# Patient Record
Sex: MALE | Race: OTHER | HISPANIC OR LATINO
[De-identification: names, ages, dates, MRNs, and addresses within clinical notes are randomized per-mention and may not be internally consistent; named-entity substitution may affect disease eponyms.]

---

## 2020-01-10 ENCOUNTER — APPOINTMENT (OUTPATIENT)
Dept: INTERNAL MEDICINE | Facility: CLINIC | Age: 60
End: 2020-01-10

## 2020-04-06 ENCOUNTER — INPATIENT (INPATIENT)
Facility: HOSPITAL | Age: 60
LOS: 10 days | Discharge: HOME | DRG: 177 | End: 2020-04-17
Attending: HOSPITALIST | Admitting: HOSPITALIST
Payer: COMMERCIAL

## 2020-04-06 ENCOUNTER — TRANSCRIPTION ENCOUNTER (OUTPATIENT)
Age: 60
End: 2020-04-06

## 2020-04-06 VITALS
OXYGEN SATURATION: 95 % | RESPIRATION RATE: 20 BRPM | HEIGHT: 65 IN | SYSTOLIC BLOOD PRESSURE: 143 MMHG | HEART RATE: 86 BPM | TEMPERATURE: 99 F | WEIGHT: 210.1 LBS | DIASTOLIC BLOOD PRESSURE: 77 MMHG

## 2020-04-06 DIAGNOSIS — J18.9 PNEUMONIA, UNSPECIFIED ORGANISM: ICD-10-CM

## 2020-04-06 LAB
ALBUMIN SERPL ELPH-MCNC: 3.5 G/DL — SIGNIFICANT CHANGE UP (ref 3.3–5.2)
ALP SERPL-CCNC: 84 U/L — SIGNIFICANT CHANGE UP (ref 40–120)
ALT FLD-CCNC: 85 U/L — HIGH
ANION GAP SERPL CALC-SCNC: 17 MMOL/L — SIGNIFICANT CHANGE UP (ref 5–17)
AST SERPL-CCNC: 98 U/L — HIGH
BASOPHILS # BLD AUTO: 0.01 K/UL — SIGNIFICANT CHANGE UP (ref 0–0.2)
BASOPHILS NFR BLD AUTO: 0.1 % — SIGNIFICANT CHANGE UP (ref 0–2)
BILIRUB SERPL-MCNC: 0.6 MG/DL — SIGNIFICANT CHANGE UP (ref 0.4–2)
BUN SERPL-MCNC: 11 MG/DL — SIGNIFICANT CHANGE UP (ref 8–20)
CALCIUM SERPL-MCNC: 9.3 MG/DL — SIGNIFICANT CHANGE UP (ref 8.6–10.2)
CHLORIDE SERPL-SCNC: 91 MMOL/L — LOW (ref 98–107)
CO2 SERPL-SCNC: 23 MMOL/L — SIGNIFICANT CHANGE UP (ref 22–29)
CREAT SERPL-MCNC: 0.77 MG/DL — SIGNIFICANT CHANGE UP (ref 0.5–1.3)
CRP SERPL-MCNC: 22.98 MG/DL — HIGH (ref 0–0.4)
EOSINOPHIL # BLD AUTO: 0 K/UL — SIGNIFICANT CHANGE UP (ref 0–0.5)
EOSINOPHIL NFR BLD AUTO: 0 % — SIGNIFICANT CHANGE UP (ref 0–6)
ERYTHROCYTE [SEDIMENTATION RATE] IN BLOOD: 15 MM/HR — SIGNIFICANT CHANGE UP (ref 0–20)
FERRITIN SERPL-MCNC: 3420 NG/ML — HIGH (ref 30–400)
GLUCOSE SERPL-MCNC: 115 MG/DL — HIGH (ref 70–99)
HCT VFR BLD CALC: 45.1 % — SIGNIFICANT CHANGE UP (ref 39–50)
HGB BLD-MCNC: 14.7 G/DL — SIGNIFICANT CHANGE UP (ref 13–17)
IMM GRANULOCYTES NFR BLD AUTO: 0.8 % — SIGNIFICANT CHANGE UP (ref 0–1.5)
LDH SERPL L TO P-CCNC: 728 U/L — HIGH (ref 98–192)
LYMPHOCYTES # BLD AUTO: 1.37 K/UL — SIGNIFICANT CHANGE UP (ref 1–3.3)
LYMPHOCYTES # BLD AUTO: 9.6 % — LOW (ref 13–44)
MCHC RBC-ENTMCNC: 29.6 PG — SIGNIFICANT CHANGE UP (ref 27–34)
MCHC RBC-ENTMCNC: 32.6 GM/DL — SIGNIFICANT CHANGE UP (ref 32–36)
MCV RBC AUTO: 90.9 FL — SIGNIFICANT CHANGE UP (ref 80–100)
MONOCYTES # BLD AUTO: 0.6 K/UL — SIGNIFICANT CHANGE UP (ref 0–0.9)
MONOCYTES NFR BLD AUTO: 4.2 % — SIGNIFICANT CHANGE UP (ref 2–14)
NEUTROPHILS # BLD AUTO: 12.17 K/UL — HIGH (ref 1.8–7.4)
NEUTROPHILS NFR BLD AUTO: 85.3 % — HIGH (ref 43–77)
PLATELET # BLD AUTO: 313 K/UL — SIGNIFICANT CHANGE UP (ref 150–400)
POTASSIUM SERPL-MCNC: 5.1 MMOL/L — SIGNIFICANT CHANGE UP (ref 3.5–5.3)
POTASSIUM SERPL-SCNC: 5.1 MMOL/L — SIGNIFICANT CHANGE UP (ref 3.5–5.3)
PROCALCITONIN SERPL-MCNC: 0.19 NG/ML — HIGH (ref 0.02–0.1)
PROT SERPL-MCNC: 7.8 G/DL — SIGNIFICANT CHANGE UP (ref 6.6–8.7)
RBC # BLD: 4.96 M/UL — SIGNIFICANT CHANGE UP (ref 4.2–5.8)
RBC # FLD: 12.7 % — SIGNIFICANT CHANGE UP (ref 10.3–14.5)
SODIUM SERPL-SCNC: 131 MMOL/L — LOW (ref 135–145)
TROPONIN T SERPL-MCNC: <0.01 NG/ML — SIGNIFICANT CHANGE UP (ref 0–0.06)
WBC # BLD: 14.26 K/UL — HIGH (ref 3.8–10.5)
WBC # FLD AUTO: 14.26 K/UL — HIGH (ref 3.8–10.5)

## 2020-04-06 PROCEDURE — 93010 ELECTROCARDIOGRAM REPORT: CPT

## 2020-04-06 PROCEDURE — 99285 EMERGENCY DEPT VISIT HI MDM: CPT

## 2020-04-06 PROCEDURE — 71045 X-RAY EXAM CHEST 1 VIEW: CPT | Mod: 26

## 2020-04-06 PROCEDURE — 99223 1ST HOSP IP/OBS HIGH 75: CPT

## 2020-04-06 RX ORDER — LOSARTAN POTASSIUM 100 MG/1
50 TABLET, FILM COATED ORAL DAILY
Refills: 0 | Status: DISCONTINUED | OUTPATIENT
Start: 2020-04-06 | End: 2020-04-17

## 2020-04-06 RX ORDER — HYDROXYCHLOROQUINE SULFATE 200 MG
TABLET ORAL
Refills: 0 | Status: COMPLETED | OUTPATIENT
Start: 2020-04-06 | End: 2020-04-11

## 2020-04-06 RX ORDER — HYDROXYCHLOROQUINE SULFATE 200 MG
200 TABLET ORAL EVERY 12 HOURS
Refills: 0 | Status: COMPLETED | OUTPATIENT
Start: 2020-04-07 | End: 2020-04-11

## 2020-04-06 RX ORDER — ACETAMINOPHEN 500 MG
650 TABLET ORAL EVERY 4 HOURS
Refills: 0 | Status: DISCONTINUED | OUTPATIENT
Start: 2020-04-06 | End: 2020-04-17

## 2020-04-06 RX ORDER — ALBUTEROL 90 UG/1
2 AEROSOL, METERED ORAL EVERY 4 HOURS
Refills: 0 | Status: DISCONTINUED | OUTPATIENT
Start: 2020-04-06 | End: 2020-04-17

## 2020-04-06 RX ORDER — IRBESARTAN 75 MG/1
1 TABLET ORAL
Qty: 0 | Refills: 0 | DISCHARGE

## 2020-04-06 RX ORDER — THIAMINE MONONITRATE (VIT B1) 100 MG
200 TABLET ORAL DAILY
Refills: 0 | Status: DISCONTINUED | OUTPATIENT
Start: 2020-04-06 | End: 2020-04-17

## 2020-04-06 RX ORDER — ACETAMINOPHEN 500 MG
975 TABLET ORAL ONCE
Refills: 0 | Status: COMPLETED | OUTPATIENT
Start: 2020-04-06 | End: 2020-04-06

## 2020-04-06 RX ORDER — HYDROXYCHLOROQUINE SULFATE 200 MG
400 TABLET ORAL EVERY 12 HOURS
Refills: 0 | Status: COMPLETED | OUTPATIENT
Start: 2020-04-06 | End: 2020-04-07

## 2020-04-06 RX ORDER — ASCORBIC ACID 60 MG
1000 TABLET,CHEWABLE ORAL DAILY
Refills: 0 | Status: DISCONTINUED | OUTPATIENT
Start: 2020-04-06 | End: 2020-04-17

## 2020-04-06 RX ORDER — ENOXAPARIN SODIUM 100 MG/ML
40 INJECTION SUBCUTANEOUS DAILY
Refills: 0 | Status: DISCONTINUED | OUTPATIENT
Start: 2020-04-06 | End: 2020-04-07

## 2020-04-06 RX ADMIN — Medication 975 MILLIGRAM(S): at 19:13

## 2020-04-06 NOTE — ED PROVIDER NOTE - PROGRESS NOTE DETAILS
Bk Hoffman, Resident: Pt noted to be comfortable on 6L RA, 96% O2 saturation, RR 20. Bk Hoffman, Resident: Pt noted to be comfortable on 6L RA, 95% O2 sat. Spoke with hospitalist Dr. Melendez who agrees to admission.

## 2020-04-06 NOTE — ED PROVIDER NOTE - OBJECTIVE STATEMENT
Pt is a 60 y.o. M hx HTN presenting with shortness of breath for two days. The pt has had intermittent fevers for the last weeks for which he has been taking alcohol. He states he has had a nonproductive cough, lower back pain, and shortness of breath for the last two days. Denies any sick contacts, recent history of travel. Denies chest pain, nausea, vomiting. Has had decreased appetite.

## 2020-04-06 NOTE — H&P ADULT - NSHPPHYSICALEXAM_GEN_ALL_CORE
Vital Signs Last 24 Hrs  T(C): 37.4 (06 Apr 2020 19:52), Max: 38 (06 Apr 2020 18:55)  T(F): 99.3 (06 Apr 2020 19:52), Max: 100.4 (06 Apr 2020 18:55)  HR: 84 (06 Apr 2020 18:55) (84 - 86)  BP: 167/83 (06 Apr 2020 18:55) (143/77 - 167/83)  RR: 20 (06 Apr 2020 18:55) (20 - 20)  SpO2: 100% (06 Apr 2020 18:55) (88% - 100%)    General: Well nourished/Well developed, NAD  Head:  Normocephalic, atraumatic  ENT:  Mucosa moist, no ulcerations (ED provider exam)  Neck:  Supple, no sinuses or palpable masses  Respiratory: CTA B/L  CV: RRR, S1S2, no murmur  GI: Soft, NT, ND no palpable mass  Extremities: No edema, + peripheral pulses, FROM all 4 extremity  Neurology: A&Ox3, no focalization signs

## 2020-04-06 NOTE — H&P ADULT - HISTORY OF PRESENT ILLNESS
A 60 y.o. Male with PMHx significant for HTN, HDL presents c/o shortness of breath for three days. Patient reports his symptoms started 5-6 days ago, and initially presented with back pain/muscle aches, headaches, dry cough and subjective fever, poor apatite. Patient reports, he called his PMD who advised him to take Tylenol for his symptoms and self quarantine. Today SOB worsened, noted also chest tightness worsened by ambulation  Denies any sick contacts, recent history of travel. Denies chest pain, nausea, vomiting.   At the ED noted to be hypoxic sating 88% on RA, was placed on 5 Lts of O2 via NC, mild fever 100.4, CXR: bilateral interstitial lung opacities

## 2020-04-06 NOTE — ED PROVIDER NOTE - NS ED ROS FT
Constitutional: +fever, no sweats, and no chills.  Eyes: no pain, no redness, and no visual changes.  ENMT: no ear pain and no hearing problems, no nasal congestion/drainage, no dysphagia, and no throat pain, no neck pain, no stiffness  CV: no chest pain, no edema.  Resp: +cough/dyspnea  GI: no abdominal pain, no bloating, no constipation, no diarrhea, no nausea and no vomiting.  : no dysuria, no hematuria  MSK: +lower back pain, no weakness  Skin: no jaundice, no lesions, and no rashes.  Neuro: no LOC, no headache, no sensory deficits, and no weakness.

## 2020-04-06 NOTE — ED ADULT NURSE REASSESSMENT NOTE - NS ED NURSE REASSESS COMMENT FT1
Patient resting comfortably in stretcher, awake alert, and oriented times 3, breathing unlabored.  Patient has no complaints at this time.

## 2020-04-06 NOTE — H&P ADULT - NSHPLABSRESULTS_GEN_ALL_CORE
14.7   14.26 )-----------( 313      ( 06 Apr 2020 15:57 )             45.1     04-06    131<L>  |  91<L>  |  11.0  ----------------------------<  115<H>  5.1   |  23.0  |  0.77    Ca    9.3      06 Apr 2020 15:57    TPro  7.8  /  Alb  3.5  /  TBili  0.6  /  DBili  x   /  AST  98<H>  /  ALT  85<H>  /  AlkPhos  84  04-06      < from: Xray Chest 1 View-PORTABLE IMMEDIATE (04.06.20 @ 14:28) >     EXAM:  XR CHEST PORTABLE IMMED 1V                          PROCEDURE DATE:  04/06/2020          INTERPRETATION:  Clinical information: Shortness of breath.    TECHNIQUE: Frontal view of the chest.    COMPARISON: Prior chest x-ray examination dated4/6/2028 at 11:55 AM.    FINDINGS: Vague patchy bilateral interstitial lung opacities appear grossly similar. The heart size is at the upper limits of normal. The visualized osseous structures are unremarkable.    IMPRESSION: Unchanged vague patchy bilateral interstitial lung opacities. Developing multifocal pneumonia is most likely. COVID-19 pneumonia cannot be excluded. Clinical correlation is required.    < end of copied text >

## 2020-04-06 NOTE — ED PROVIDER NOTE - ATTENDING CONTRIBUTION TO CARE
60yoM; with pmh signif for HTN; now p/w sobx2 days.  patient reports intermittent fevers and cough for 2 weeks. cough--nonproductive, progressively worsening, with sob and chest pain these past 2 days.  denies sick contacts. denies travel. denies n/v/d. denies chestp ain. is reporting some lower back pain.   EXAM:  General:  mild resp distress  Head:     NC/AT, EOMI, oral mucosa moist  Neck:     trachea midline  Lungs:     CTA b/l, trace exp wheeze  CVS:     S1S2, RRR, no m/g/r  Abd:     +BS, s/nt/nd, no organomegaly  Ext:    2+ radial and pedal pulses, no c/c/e  Neuro: AAOx3, no sensory/motor deficits  A/P:  60yoM p/w sob with hypoxia, likely covid pna  -xray, labs, o2 supplementation, and likely admit

## 2020-04-06 NOTE — H&P ADULT - ASSESSMENT
A 59 yo Male with PMH significant for HTN, HLD presents today c/o cough, subjective fever, worsening SOB for the 3 days, being admitted for Acute Respiratory Failure with Hypoxia likely secondary to viral Pneumonia, COVID-19 result pending.  In the ED, saturation on RA 88%; placed on O2 via NC 5-6; inflammatory makers elevated. CXR: multifocal pneumonia    > Admit to medicine service  > Bed: Monitor and    > Diet: Dash/tlc  > Nursing: vitals per routine  > IV fluids: none  > Oxygen: O2 via NC; keep O2 saturation > 92%    Acute respiratory failure with hypoxia due to viral pneumonia, pending CoVID-19 result    Admit to medical floor with    CoVID-19 pending   Inflammatory makers elevated (CRP, LDH, Ferritin)   Oxygen therapy   Isolation protocol   Tylenol PRN   Prone positioning every hour for 15minutes or more as tolerated   F/u am labs    Leukocytosis likely due viral illness   WBC: 14k  F/u CBC am    Transaminitis   AST and ALT elevated   Likely due to viral illness    Trend LFT     Hyponatremia   Na: 131   Likely due to dehydration 2/2 poor PO intake   Will avoid IVF, has been shown that COVID patients tend to fluid over load easily  Monitor BMP     HTN  Add Losartan 50 mg QD (at home on Irbersartan 150mg)  Dash/TLC diet    HLD  Hold home regimen due to elevated LFT'; Atorvastatin 10mg QD     Supportive   DVT prophylaxis: Lovenox     Code status: Full code    Dispo: Length of stay likely 2-3 days, pending improvement on respiratory status A 61 yo Male with PMH significant for HTN, HLD presents today c/o cough, subjective fever, worsening SOB for the 3 days, being admitted for Acute Respiratory Failure with Hypoxia likely secondary to viral Pneumonia, COVID-19 result pending.  In the ED, saturation on RA 88%; placed on O2 via NC 5-6; inflammatory makers elevated. CXR: multifocal pneumonia    > Admit to medicine service  > Bed: Monitor and    > Diet: Dash/tlc  > Nursing: vitals per routine  > IV fluids: none  > Oxygen: O2 via NC; keep O2 saturation > 92%    Acute respiratory failure with hypoxia due to viral pneumonia, pending CoVID-19 result    Admit to medical floor with    CoVID-19 pending   Inflammatory makers elevated (CRP, LDH, Ferritin)   Plaquenil started as per protocol; Please consult ID in AM to continue the med.   Oxygen therapy   Isolation protocol   Tylenol PRN   Prone positioning every hour for 15minutes or more as tolerated   F/u am labs    Leukocytosis likely due viral illness   WBC: 14k  F/u CBC am    Transaminitis   AST and ALT elevated   Likely due to viral illness    Trend LFT     Hyponatremia   Na: 131   Likely due to dehydration 2/2 poor PO intake   Will avoid IVF, has been shown that COVID patients tend to fluid over load easily  Monitor BMP     HTN  Add Losartan 50 mg QD (at home on Irbersartan 150mg)  Dash/TLC diet    HLD  Hold home regimen due to elevated LFT'; Atorvastatin 10mg QD     Supportive   DVT prophylaxis: Lovenox     Code status: Full code    Dispo: Length of stay likely 2-3 days, pending improvement on respiratory status

## 2020-04-06 NOTE — H&P ADULT - ATTENDING COMMENTS
59 yo Male with PMH significant for HTN, HLD presents today c/o cough, subjective fever, worsening SOB for the 3 days, being admitted for Acute Respiratory Failure with Hypoxia due to suspected COVID-19 infection.   -cont with supplemental oxygen as needed to keep Sao2>94  -Currently patient tolerating 5L NC  -admit to monitor bed with   -EKG reviewed , started on Plaquenil   -Agree with rest of plan as above

## 2020-04-06 NOTE — ED PROVIDER NOTE - PHYSICAL EXAMINATION
General: well appearing, NAD  Head:  NC, AT  Eyes: EOMI, PERRLA, no scleral icterus  Ears: no erythema/drainage  Nose: midline, no bleeding/drainage  Throat: MMM  Cardiac: RRR, no m/r/g, no lower extremity edema  Respiratory: CTABL, no wheezes/rales/rhonchi, equal chest wall expansions  Abdomen: soft, ND, NT, no rebound tenderness, no guarding, nonperitonitic  MSK/Vascular: full ROM, distal pulses intact, soft compartments, warm extremities  Neuro: AAOx3, negative pronator drift, strength 5/5, sensation to light touch intact, finger to nose coordination intact, cranial nerves 2-12 intact  Psych: calm, cooperative, normal affect

## 2020-04-06 NOTE — ED PROVIDER NOTE - CLINICAL SUMMARY MEDICAL DECISION MAKING FREE TEXT BOX
Pt is a 60 y.o. M presenting with fever, cough, and hypoxia requiring O2 supplementation. Labs, imaging, EKG. Will provide medication prn and reassess.

## 2020-04-06 NOTE — ED ADULT TRIAGE NOTE - CHIEF COMPLAINT QUOTE
"I went to urgent care for cough and shortness of breath" "I am out of breath when I walk" c/o being sent from urgent care for subjective fevers sough SOB and fatigue. received CXR and swab prior to arrival -per EMS. SAO2 93% on NRB. skin warm and dry

## 2020-04-07 LAB
ALBUMIN SERPL ELPH-MCNC: 3.4 G/DL — SIGNIFICANT CHANGE UP (ref 3.3–5.2)
ALP SERPL-CCNC: 82 U/L — SIGNIFICANT CHANGE UP (ref 40–120)
ALT FLD-CCNC: 79 U/L — HIGH
ANION GAP SERPL CALC-SCNC: 14 MMOL/L — SIGNIFICANT CHANGE UP (ref 5–17)
AST SERPL-CCNC: 78 U/L — HIGH
BASOPHILS # BLD AUTO: 0 K/UL — SIGNIFICANT CHANGE UP (ref 0–0.2)
BASOPHILS NFR BLD AUTO: 0 % — SIGNIFICANT CHANGE UP (ref 0–2)
BILIRUB SERPL-MCNC: 0.7 MG/DL — SIGNIFICANT CHANGE UP (ref 0.4–2)
BUN SERPL-MCNC: 18 MG/DL — SIGNIFICANT CHANGE UP (ref 8–20)
CALCIUM SERPL-MCNC: 9 MG/DL — SIGNIFICANT CHANGE UP (ref 8.6–10.2)
CHLORIDE SERPL-SCNC: 94 MMOL/L — LOW (ref 98–107)
CO2 SERPL-SCNC: 24 MMOL/L — SIGNIFICANT CHANGE UP (ref 22–29)
CREAT SERPL-MCNC: 0.71 MG/DL — SIGNIFICANT CHANGE UP (ref 0.5–1.3)
EOSINOPHIL # BLD AUTO: 0 K/UL — SIGNIFICANT CHANGE UP (ref 0–0.5)
EOSINOPHIL NFR BLD AUTO: 0 % — SIGNIFICANT CHANGE UP (ref 0–6)
GIANT PLATELETS BLD QL SMEAR: PRESENT — SIGNIFICANT CHANGE UP
GLUCOSE SERPL-MCNC: 165 MG/DL — HIGH (ref 70–99)
HCT VFR BLD CALC: 46.1 % — SIGNIFICANT CHANGE UP (ref 39–50)
HCV AB S/CO SERPL IA: 0.15 S/CO — SIGNIFICANT CHANGE UP (ref 0–0.99)
HCV AB SERPL-IMP: SIGNIFICANT CHANGE UP
HGB BLD-MCNC: 15.2 G/DL — SIGNIFICANT CHANGE UP (ref 13–17)
LYMPHOCYTES # BLD AUTO: 0.27 K/UL — LOW (ref 1–3.3)
LYMPHOCYTES # BLD AUTO: 3.7 % — LOW (ref 13–44)
MAGNESIUM SERPL-MCNC: 2.5 MG/DL — SIGNIFICANT CHANGE UP (ref 1.6–2.6)
MANUAL SMEAR VERIFICATION: SIGNIFICANT CHANGE UP
MCHC RBC-ENTMCNC: 30.2 PG — SIGNIFICANT CHANGE UP (ref 27–34)
MCHC RBC-ENTMCNC: 33 GM/DL — SIGNIFICANT CHANGE UP (ref 32–36)
MCV RBC AUTO: 91.5 FL — SIGNIFICANT CHANGE UP (ref 80–100)
MONOCYTES # BLD AUTO: 0.07 K/UL — SIGNIFICANT CHANGE UP (ref 0–0.9)
MONOCYTES NFR BLD AUTO: 1 % — LOW (ref 2–14)
NEUTROPHILS # BLD AUTO: 6.91 K/UL — SIGNIFICANT CHANGE UP (ref 1.8–7.4)
NEUTROPHILS NFR BLD AUTO: 93.6 % — HIGH (ref 43–77)
NEUTS BAND # BLD: 1.7 % — SIGNIFICANT CHANGE UP (ref 0–8)
PHOSPHATE SERPL-MCNC: 3.3 MG/DL — SIGNIFICANT CHANGE UP (ref 2.4–4.7)
PLAT MORPH BLD: NORMAL — SIGNIFICANT CHANGE UP
PLATELET # BLD AUTO: 342 K/UL — SIGNIFICANT CHANGE UP (ref 150–400)
POIKILOCYTOSIS BLD QL AUTO: SIGNIFICANT CHANGE UP
POTASSIUM SERPL-MCNC: 4.9 MMOL/L — SIGNIFICANT CHANGE UP (ref 3.5–5.3)
POTASSIUM SERPL-SCNC: 4.9 MMOL/L — SIGNIFICANT CHANGE UP (ref 3.5–5.3)
PROT SERPL-MCNC: 7.5 G/DL — SIGNIFICANT CHANGE UP (ref 6.6–8.7)
RBC # BLD: 5.04 M/UL — SIGNIFICANT CHANGE UP (ref 4.2–5.8)
RBC # FLD: 13 % — SIGNIFICANT CHANGE UP (ref 10.3–14.5)
RBC BLD AUTO: ABNORMAL
SARS-COV-2 RNA SPEC QL NAA+PROBE: SIGNIFICANT CHANGE UP
SODIUM SERPL-SCNC: 132 MMOL/L — LOW (ref 135–145)
WBC # BLD: 7.25 K/UL — SIGNIFICANT CHANGE UP (ref 3.8–10.5)
WBC # FLD AUTO: 7.25 K/UL — SIGNIFICANT CHANGE UP (ref 3.8–10.5)

## 2020-04-07 PROCEDURE — 99232 SBSQ HOSP IP/OBS MODERATE 35: CPT

## 2020-04-07 RX ORDER — ENOXAPARIN SODIUM 100 MG/ML
40 INJECTION SUBCUTANEOUS
Refills: 0 | Status: DISCONTINUED | OUTPATIENT
Start: 2020-04-07 | End: 2020-04-17

## 2020-04-07 RX ADMIN — ALBUTEROL 2 PUFF(S): 90 AEROSOL, METERED ORAL at 19:02

## 2020-04-07 RX ADMIN — ALBUTEROL 2 PUFF(S): 90 AEROSOL, METERED ORAL at 13:10

## 2020-04-07 RX ADMIN — Medication 400 MILLIGRAM(S): at 18:59

## 2020-04-07 RX ADMIN — Medication 200 MILLIGRAM(S): at 13:08

## 2020-04-07 RX ADMIN — Medication 1000 MILLIGRAM(S): at 13:08

## 2020-04-07 RX ADMIN — LOSARTAN POTASSIUM 50 MILLIGRAM(S): 100 TABLET, FILM COATED ORAL at 06:06

## 2020-04-07 RX ADMIN — Medication 400 MILLIGRAM(S): at 05:59

## 2020-04-07 RX ADMIN — ENOXAPARIN SODIUM 40 MILLIGRAM(S): 100 INJECTION SUBCUTANEOUS at 12:30

## 2020-04-07 RX ADMIN — Medication 200 MILLIGRAM(S): at 22:48

## 2020-04-07 NOTE — PROGRESS NOTE ADULT - SUBJECTIVE AND OBJECTIVE BOX
seen for SOB, r/o covid      still w/ sob cough  ros otherwise negative     MEDICATIONS  (STANDING):  ascorbic acid 1000 milliGRAM(s) Oral daily  enoxaparin Injectable 40 milliGRAM(s) SubCutaneous two times a day  hydroxychloroquine   Oral   hydroxychloroquine 400 milliGRAM(s) Oral every 12 hours  hydroxychloroquine 200 milliGRAM(s) Oral every 12 hours  losartan 50 milliGRAM(s) Oral daily  thiamine 200 milliGRAM(s) Oral daily    MEDICATIONS  (PRN):  acetaminophen   Tablet .. 650 milliGRAM(s) Oral every 4 hours PRN Temp greater or equal to 38.5C (101.3F)  acetaminophen  Suppository .. 650 milliGRAM(s) Rectal every 4 hours PRN Temp greater or equal to 38.5C (101.3F)  ALBUTerol    90 MICROgram(s) HFA Inhaler 2 Puff(s) Inhalation every 4 hours PRN Shortness of Breath and/or Wheezing  benzonatate 100 milliGRAM(s) Oral three times a day PRN Cough      Allergies    No Known Allergies      Vital Signs Last 24 Hrs  T(C): 36.8 (07 Apr 2020 09:15), Max: 38 (06 Apr 2020 18:55)  T(F): 98.2 (07 Apr 2020 09:15), Max: 100.4 (06 Apr 2020 18:55)  HR: 73 (07 Apr 2020 09:15) (73 - 85)  BP: 116/76 (07 Apr 2020 09:15) (116/76 - 167/83)  BP(mean): --  RR: 18 (07 Apr 2020 09:15) (18 - 20)  SpO2: 96% (07 Apr 2020 09:15) (92% - 100%)    PHYSICAL EXAM:    GENERAL: NAD  CHEST/LUNG: Clear to percussion bilaterally  HEART: Regular rate and rhythm; S1 S2  ABDOMEN: Soft, Nondistended; Bowel sounds present  EXTREMITIES: no edema  NERVOUS SYSTEM:  Alert & Oriented X3, Motor Strength 5/5 B/L upper and lower extremities      LABS:                        15.2   7.25  )-----------( 342      ( 07 Apr 2020 12:25 )             46.1     04-07    132<L>  |  94<L>  |  18.0  ----------------------------<  165<H>  4.9   |  24.0  |  0.71    Ca    9.0      07 Apr 2020 12:25  Phos  3.3     04-07  Mg     2.5     04-07    TPro  7.5  /  Alb  3.4  /  TBili  0.7  /  DBili  x   /  AST  78<H>  /  ALT  79<H>  /  AlkPhos  82  04-07          CAPILLARY BLOOD GLUCOSE            RADIOLOGY & ADDITIONAL TESTS:

## 2020-04-07 NOTE — ED ADULT NURSE REASSESSMENT NOTE - GENERAL PATIENT STATE
resting/sleeping
cooperative/resting/sleeping/comfortable appearance
smiling/interactive/comfortable appearance/cooperative/resting/sleeping

## 2020-04-07 NOTE — ED ADULT NURSE REASSESSMENT NOTE - COMFORT CARE
meal provided
darkened lights/warm blanket provided/po fluids offered/plan of care explained
plan of care explained/warm blanket provided/po fluids offered

## 2020-04-08 LAB
RAPID RVP RESULT: SIGNIFICANT CHANGE UP
SARS-COV-2 RNA SPEC QL NAA+PROBE: SIGNIFICANT CHANGE UP

## 2020-04-08 PROCEDURE — 99223 1ST HOSP IP/OBS HIGH 75: CPT

## 2020-04-08 PROCEDURE — 71260 CT THORAX DX C+: CPT | Mod: 26

## 2020-04-08 PROCEDURE — 99232 SBSQ HOSP IP/OBS MODERATE 35: CPT

## 2020-04-08 RX ORDER — AZITHROMYCIN 500 MG/1
500 TABLET, FILM COATED ORAL DAILY
Refills: 0 | Status: COMPLETED | OUTPATIENT
Start: 2020-04-08 | End: 2020-04-11

## 2020-04-08 RX ORDER — CEFTRIAXONE 500 MG/1
1000 INJECTION, POWDER, FOR SOLUTION INTRAMUSCULAR; INTRAVENOUS EVERY 24 HOURS
Refills: 0 | Status: COMPLETED | OUTPATIENT
Start: 2020-04-08 | End: 2020-04-13

## 2020-04-08 RX ADMIN — ALBUTEROL 2 PUFF(S): 90 AEROSOL, METERED ORAL at 05:27

## 2020-04-08 RX ADMIN — Medication 200 MILLIGRAM(S): at 11:33

## 2020-04-08 RX ADMIN — Medication 1000 MILLIGRAM(S): at 11:32

## 2020-04-08 RX ADMIN — ENOXAPARIN SODIUM 40 MILLIGRAM(S): 100 INJECTION SUBCUTANEOUS at 00:40

## 2020-04-08 RX ADMIN — ENOXAPARIN SODIUM 40 MILLIGRAM(S): 100 INJECTION SUBCUTANEOUS at 11:32

## 2020-04-08 RX ADMIN — AZITHROMYCIN 500 MILLIGRAM(S): 500 TABLET, FILM COATED ORAL at 22:37

## 2020-04-08 RX ADMIN — Medication 200 MILLIGRAM(S): at 11:32

## 2020-04-08 RX ADMIN — ENOXAPARIN SODIUM 40 MILLIGRAM(S): 100 INJECTION SUBCUTANEOUS at 22:37

## 2020-04-08 RX ADMIN — LOSARTAN POTASSIUM 50 MILLIGRAM(S): 100 TABLET, FILM COATED ORAL at 06:21

## 2020-04-08 RX ADMIN — Medication 200 MILLIGRAM(S): at 22:37

## 2020-04-08 RX ADMIN — ALBUTEROL 2 PUFF(S): 90 AEROSOL, METERED ORAL at 00:22

## 2020-04-08 NOTE — CONSULT NOTE ADULT - ASSESSMENT
60 y.o. Male with PMHx significant for HTN, HDL presents c/o shortness of breath for three days. Patient reports his symptoms started 5-6 days ago, and initially presented with back pain/muscle aches, headaches, dry cough and subjective fever, poor apatite. Patient reports, he called his PMD who advised him to take Tylenol for his symptoms and self quarantine.   At the ED noted to be hypoxic sating 88% on RA, was placed on 5 Lts of O2 via NC, mild fever 100.4, CXR: bilateral interstitial lung opacities     HTN  Hypoxia  r/o CAP  r/o COVID 19    - COVID 19 x 2 negative-presentation suggstive of COVID-will repeat  - RVP negative  - Check urine legionella  - Agree with Ct chest  - Continue HCQ  - Ceftriaxone + azithromycin  - If fever recurs check BCX x 2  - If COVID 19 returns negative-suggest pulm eval  - May need rheum w/u  - Will consider steroids pending above  - Trend Fever  - Trend Leukocytosis    Dr Mccullough  Will Follow

## 2020-04-08 NOTE — CONSULT NOTE ADULT - SUBJECTIVE AND OBJECTIVE BOX
[facility]  INFECTIOUS DISEASES AND INTERNAL MEDICINE at [place]  =======================================================  Booker Mancilla MD  Diplomates American Board of Internal Medicine and Infectious Diseases  Tel: [phone]      Fax: [phone]  =======================================================      [id number]  LARRY SHRESTHA is a 60y  Male     CC: Patient is a 60y old  Male who presents with a chief complaint of SOB/cough (08 Apr 2020 13:44)    HPI:  A 60 y.o. Male with PMHx significant for HTN, HDL presents c/o shortness of breath for three days. Patient reports his symptoms started 5-6 days ago, and initially presented with back pain/muscle aches, headaches, dry cough and subjective fever, poor apatite. Patient reports, he called his PMD who advised him to take Tylenol for his symptoms and self quarantine. Today SOB worsened, noted also chest tightness worsened by ambulation  Denies any sick contacts, recent history of travel. Denies chest pain, nausea, vomiting.   At the ED noted to be hypoxic sating 88% on RA, was placed on 5 Lts of O2 via NC, mild fever 100.4, CXR: bilateral interstitial lung opacities (06 Apr 2020 20:58)      PAST MEDICAL & SURGICAL HISTORY:  HLD (hyperlipidemia)  HTN (hypertension)  No significant past surgical history      Social Hx: former smoker    FAMILY HISTORY:  No pertinent family history in first degree relatives      Allergies    No Known Allergies    Intolerances        MEDICATIONS  (STANDING):  ascorbic acid 1000 milliGRAM(s) Oral daily  azithromycin   Tablet 500 milliGRAM(s) Oral daily  cefTRIAXone   IVPB 1000 milliGRAM(s) IV Intermittent every 24 hours  enoxaparin Injectable 40 milliGRAM(s) SubCutaneous two times a day  hydroxychloroquine   Oral   hydroxychloroquine 200 milliGRAM(s) Oral every 12 hours  losartan 50 milliGRAM(s) Oral daily  thiamine 200 milliGRAM(s) Oral daily    MEDICATIONS  (PRN):  acetaminophen   Tablet .. 650 milliGRAM(s) Oral every 4 hours PRN Temp greater or equal to 38.5C (101.3F)  acetaminophen  Suppository .. 650 milliGRAM(s) Rectal every 4 hours PRN Temp greater or equal to 38.5C (101.3F)  ALBUTerol    90 MICROgram(s) HFA Inhaler 2 Puff(s) Inhalation every 4 hours PRN Shortness of Breath and/or Wheezing  benzonatate 100 milliGRAM(s) Oral three times a day PRN Cough      ANTIMICROBIALS:  azithromycin   Tablet 500 daily  cefTRIAXone   IVPB 1000 every 24 hours  hydroxychloroquine    hydroxychloroquine 200 every 12 hours      OTHER MEDS: MEDICATIONS  (STANDING):  acetaminophen   Tablet .. 650 every 4 hours PRN  acetaminophen  Suppository .. 650 every 4 hours PRN  ALBUTerol    90 MICROgram(s) HFA Inhaler 2 every 4 hours PRN  benzonatate 100 three times a day PRN  enoxaparin Injectable 40 two times a day  losartan 50 daily             REVIEW OF SYSTEMS:  CONSTITUTIONAL:  No Fever or chills  HEENT:  No diplopia or blurred vision.  No earache, sore throat or runny nose.  CARDIOVASCULAR:  No pressure, squeezing, strangling, tightness, heaviness or aching about the chest, neck, axilla or epigastrium.  RESPIRATORY:  + cough, shortness of breath  GASTROINTESTINAL:  No nausea, vomiting or diarrhea.  GENITOURINARY:  No dysuria, frequency or urgency. No Blood in urine  MUSCULOSKELETAL:  no joint aches, no muscle pain  SKIN:  No change in skin, hair or nails.  NEUROLOGIC:  No Headaches, seizures or weakness.  PSYCHIATRIC:  No disorder of thought or mood.  ENDOCRINE:  No heat or cold intolerance  HEMATOLOGICAL:  No easy bruising or bleeding.           I&O's Detail        Physical Exam:  Vital Signs Last 24 Hrs  T(C): 37.4 (08 Apr 2020 17:09), Max: 37.4 (08 Apr 2020 17:09)  T(F): 99.3 (08 Apr 2020 17:09), Max: 99.3 (08 Apr 2020 17:09)  HR: 79 (08 Apr 2020 17:09) (66 - 79)  BP: 120/73 (08 Apr 2020 17:09) (120/73 - 133/64)  BP(mean): --  RR: 18 (08 Apr 2020 17:09) (18 - 20)  SpO2: 91% (08 Apr 2020 17:09) (91% - 94%)    GEN: NAD, pleasant  HEENT: normocephalic and atraumatic. EOMI. PERRL.  Anicteric  NECK: Supple.   LUNGS: Clear to auscultation.  HEART: Regular rate and rhythm without murmur.  ABDOMEN: Soft, nontender, and nondistended.  Positive bowel sounds.    : No CVA tenderness  EXTREMITIES: Without any edema.  MSK: No joint swelling  NEUROLOGIC: Cranial nerves II through XII are grossly intact. No Focal Deficits  PSYCHIATRIC: Appropriate affect .  SKIN: No Rash        Labs:                        15.2   7.25  )-----------( 342      ( 07 Apr 2020 12:25 )             46.1     04-07    132<L>  |  94<L>  |  18.0  ----------------------------<  165<H>  4.9   |  24.0  |  0.71    Ca    9.0      07 Apr 2020 12:25  Phos  3.3     04-07  Mg     2.5     04-07    TPro  7.5  /  Alb  3.4  /  TBili  0.7  /  DBili  x   /  AST  78<H>  /  ALT  79<H>  /  AlkPhos  82  04-07      Radiology:  < from: Xray Chest 1 View-PORTABLE IMMEDIATE (04.06.20 @ 14:28) >  IMPRESSION: Unchanged vague patchy bilateral interstitial lung opacities. Developing multifocal pneumonia is most likely. COVID-19 pneumonia cannot be excluded. Clinical correlation is required.    < end of copied text >

## 2020-04-08 NOTE — PROGRESS NOTE ADULT - SUBJECTIVE AND OBJECTIVE BOX
seen for hypoxic, r/o covid    no acute complaints. dry cough  no chest pain  ros negative    still requiring o2 as desaturates of NC    MEDICATIONS  (STANDING):  ascorbic acid 1000 milliGRAM(s) Oral daily  enoxaparin Injectable 40 milliGRAM(s) SubCutaneous two times a day  hydroxychloroquine   Oral   hydroxychloroquine 200 milliGRAM(s) Oral every 12 hours  losartan 50 milliGRAM(s) Oral daily  thiamine 200 milliGRAM(s) Oral daily    MEDICATIONS  (PRN):  acetaminophen   Tablet .. 650 milliGRAM(s) Oral every 4 hours PRN Temp greater or equal to 38.5C (101.3F)  acetaminophen  Suppository .. 650 milliGRAM(s) Rectal every 4 hours PRN Temp greater or equal to 38.5C (101.3F)  ALBUTerol    90 MICROgram(s) HFA Inhaler 2 Puff(s) Inhalation every 4 hours PRN Shortness of Breath and/or Wheezing  benzonatate 100 milliGRAM(s) Oral three times a day PRN Cough      Allergies    No Known Allergies      Vital Signs Last 24 Hrs  T(C): 36.8 (08 Apr 2020 06:02), Max: 36.9 (07 Apr 2020 19:03)  T(F): 98.2 (08 Apr 2020 06:02), Max: 98.5 (07 Apr 2020 19:03)  HR: 79 (08 Apr 2020 06:02) (66 - 79)  BP: 131/79 (08 Apr 2020 06:02) (120/80 - 135/80)  BP(mean): --  RR: 18 (08 Apr 2020 06:02) (18 - 20)  SpO2: 93% (08 Apr 2020 06:02) (93% - 96%)    PHYSICAL EXAM:    GENERAL: NAD  CHEST/LUNG: Clear to percussion bilaterally  HEART: Regular rate and rhythm; S1 S2  ABDOMEN: Soft, Bowel sounds present  EXTREMITIES:  no edema   NERVOUS SYSTEM:  Alert & Oriented X3,  Motor Strength 5/5 B/L upper and lower extremities  PSYCH: normal mood, appropriate response.    LABS:                        15.2   7.25  )-----------( 342      ( 07 Apr 2020 12:25 )             46.1     04-07    132<L>  |  94<L>  |  18.0  ----------------------------<  165<H>  4.9   |  24.0  |  0.71    Ca    9.0      07 Apr 2020 12:25  Phos  3.3     04-07  Mg     2.5     04-07    TPro  7.5  /  Alb  3.4  /  TBili  0.7  /  DBili  x   /  AST  78<H>  /  ALT  79<H>  /  AlkPhos  82  04-07          CAPILLARY BLOOD GLUCOSE            RADIOLOGY & ADDITIONAL TESTS:

## 2020-04-09 LAB
CRP SERPL-MCNC: 19.34 MG/DL — HIGH (ref 0–0.4)
FERRITIN SERPL-MCNC: 2335 NG/ML — HIGH (ref 30–400)
LDH SERPL L TO P-CCNC: 474 U/L — HIGH (ref 98–192)
SARS-COV-2 RNA SPEC QL NAA+PROBE: SIGNIFICANT CHANGE UP

## 2020-04-09 PROCEDURE — 99232 SBSQ HOSP IP/OBS MODERATE 35: CPT

## 2020-04-09 RX ORDER — LANOLIN ALCOHOL/MO/W.PET/CERES
3 CREAM (GRAM) TOPICAL ONCE
Refills: 0 | Status: COMPLETED | OUTPATIENT
Start: 2020-04-09 | End: 2020-04-09

## 2020-04-09 RX ADMIN — CEFTRIAXONE 100 MILLIGRAM(S): 500 INJECTION, POWDER, FOR SOLUTION INTRAMUSCULAR; INTRAVENOUS at 05:13

## 2020-04-09 RX ADMIN — Medication 3 MILLIGRAM(S): at 22:40

## 2020-04-09 RX ADMIN — Medication 200 MILLIGRAM(S): at 10:20

## 2020-04-09 RX ADMIN — AZITHROMYCIN 500 MILLIGRAM(S): 500 TABLET, FILM COATED ORAL at 10:12

## 2020-04-09 RX ADMIN — ALBUTEROL 2 PUFF(S): 90 AEROSOL, METERED ORAL at 05:13

## 2020-04-09 RX ADMIN — Medication 1000 MILLIGRAM(S): at 10:12

## 2020-04-09 RX ADMIN — ENOXAPARIN SODIUM 40 MILLIGRAM(S): 100 INJECTION SUBCUTANEOUS at 22:32

## 2020-04-09 RX ADMIN — ENOXAPARIN SODIUM 40 MILLIGRAM(S): 100 INJECTION SUBCUTANEOUS at 10:12

## 2020-04-09 RX ADMIN — LOSARTAN POTASSIUM 50 MILLIGRAM(S): 100 TABLET, FILM COATED ORAL at 05:13

## 2020-04-09 RX ADMIN — Medication 200 MILLIGRAM(S): at 22:32

## 2020-04-09 RX ADMIN — Medication 200 MILLIGRAM(S): at 10:12

## 2020-04-09 NOTE — PROGRESS NOTE ADULT - SUBJECTIVE AND OBJECTIVE BOX
[facility]  INFECTIOUS DISEASES AND INTERNAL MEDICINE at [place]  =======================================================  Booker Mancilla MD  Diplomates American Board of Internal Medicine and Infectious Diseases  Tel: [phone]      Fax: [phone]  =======================================================    LARRY SHRESTHA [id number]    Follow up: r/o CAP  feels better  still on O2  no fever    Allergies:  No Known Allergies      Medications:  acetaminophen   Tablet .. 650 milliGRAM(s) Oral every 4 hours PRN  acetaminophen  Suppository .. 650 milliGRAM(s) Rectal every 4 hours PRN  ALBUTerol    90 MICROgram(s) HFA Inhaler 2 Puff(s) Inhalation every 4 hours PRN  ascorbic acid 1000 milliGRAM(s) Oral daily  azithromycin   Tablet 500 milliGRAM(s) Oral daily  benzonatate 100 milliGRAM(s) Oral three times a day PRN  cefTRIAXone   IVPB 1000 milliGRAM(s) IV Intermittent every 24 hours  enoxaparin Injectable 40 milliGRAM(s) SubCutaneous two times a day  hydroxychloroquine   Oral   hydroxychloroquine 200 milliGRAM(s) Oral every 12 hours  losartan 50 milliGRAM(s) Oral daily  thiamine 200 milliGRAM(s) Oral daily            REVIEW OF SYSTEMS:  CONSTITUTIONAL:  No Fever or chills  HEENT:   No diplopia or blurred vision.  No earache, sore throat or runny nose.  CARDIOVASCULAR:  No pressure, squeezing, strangling, tightness, heaviness or aching about the chest, neck, axilla or epigastrium.  RESPIRATORY:  No cough, shortness of breath  GASTROINTESTINAL:  No nausea, vomiting or diarrhea.  GENITOURINARY:  No dysuria, frequency or urgency. No Blood in urine  MUSCULOSKELETAL:  no joint aches, no muscle pain  SKIN:  No change in skin, hair or nails.  NEUROLOGIC:  No Headaches, seizures or weakness.  PSYCHIATRIC:  No disorder of thought or mood.  ENDOCRINE:  No heat or cold intolerance  HEMATOLOGICAL:  No easy bruising or bleeding.            Physical Exam:  ICU Vital Signs Last 24 Hrs  T(C): 37.2 (09 Apr 2020 16:38), Max: 37.4 (08 Apr 2020 17:09)  T(F): 98.9 (09 Apr 2020 16:38), Max: 99.3 (08 Apr 2020 17:09)  HR: 93 (09 Apr 2020 16:38) (79 - 93)  BP: 143/87 (09 Apr 2020 16:38) (120/73 - 143/87)  BP(mean): --  ABP: --  ABP(mean): --  RR: 18 (09 Apr 2020 16:38) (18 - 19)  SpO2: 94% (09 Apr 2020 16:38) (88% - 94%)      GEN: NAD, pleasant on O2  HEENT: normocephalic and atraumatic. EOMI. PERRL.  Anicteric   NECK: Supple.   LUNGS: Clear to auscultation.  HEART: Regular rate and rhythm without murmur.  ABDOMEN: Soft, nontender, and nondistended.  Positive bowel sounds.    : No CVA tenderness  EXTREMITIES: Without any edema.  MSK: no joint swelling  NEUROLOGIC: Cranial nerves II through XII are grossly intact. No focal deficits  PSYCHIATRIC: Appropriate affect .  SKIN: No Rash      Labs:    < from: CT Chest w/ IV Cont (04.08.20 @ 18:55) >  INTERPRETATION:  Clinical information: Difficulty breathing. Evaluate for pulmonary embolus.    CT angiogram of the chest was obtained following administration of intravenous contrast. Approximately 90 cc of Omnipaque 350 was administered. Coronal, sagittal and MIP images were submitted for review.    Few small lymph nodes are present in the pretracheal space and the AP window.     Heart is normal in size. No pericardial effusion is noted. Pulmonary arteries are normal in caliber. No filling defects are noted within the main, right and left main and lobar pulmonary artery branches bilaterally. Evaluation of the segmental and subsegmental pulmonary artery branches bilaterally is limited due to poor opacification.     No endobronchial lesions are noted. Extensive groundglass opacities are noted throughout both lungs, more so within both lower lobes. No pleural effusions are noted.    Below the diaphragm, visualized portions of the abdomen are unremarkable.     Visualized osseous structures are within normal limits.    Impression: No pulmonary embolus is noted within the main, right and left main and lobar pulmonary artery branches bilaterally. Evaluationof the segmental and subsegmental pulmonary artery branches bilaterally is limited due to poor opacification.    Extensive groundglass opacities are noted throughout both lungs. The finding is consistent with the patient's known history of viral pneumonia.      < end of copied text >

## 2020-04-09 NOTE — PROGRESS NOTE ADULT - SUBJECTIVE AND OBJECTIVE BOX
LARRY SHRESTHA    [id number]    60y      Male    Patient is a 60y old  Male who presents with a chief complaint of SOB/cough (08 Apr 2020 21:45)      INTERVAL HPI/OVERNIGHT EVENTS:    Patient is feeling some imprisonment of SOB, his 3rd COVID 19 came negative, has no fever, chills    REVIEW OF SYSTEMS:    CONSTITUTIONAL: No fever, fatigue  RESPIRATORY: No cough, No shortness of breath  CARDIOVASCULAR: No chest pain, palpitations  GASTROINTESTINAL: No abdominal, No nausea, vomiting  NEUROLOGICAL: No headaches,  loss of strength.  MISCELLANEOUS: No joint swelling or pain       Vital Signs Last 24 Hrs  T(C): 37.2 (09 Apr 2020 16:38), Max: 37.4 (08 Apr 2020 17:09)  T(F): 98.9 (09 Apr 2020 16:38), Max: 99.3 (08 Apr 2020 17:09)  HR: 93 (09 Apr 2020 16:38) (79 - 93)  BP: 143/87 (09 Apr 2020 16:38) (120/73 - 143/87)  RR: 18 (09 Apr 2020 16:38) (18 - 19)  SpO2: 94% (09 Apr 2020 16:38) (88% - 94%)    PHYSICAL EXAM:    GENERAL: Middle age  male looking comfortable   HEENT: PERRL, +EOMI  NECK: soft, Supple, No JVD,   CHEST/LUNG: Decrease air entry bilaterally; fine crackles b/l, No wheezing  HEART: S1S2+, Regular rate and rhythm; No murmurs  ABDOMEN: Soft, Nontender, Nondistended; Bowel sounds present  EXTREMITIES:  1+ Peripheral Pulses, No edema  SKIN: No rashes or lesions  NEURO: AAOX3, no focal deficits, no motor r sensory loss  PSYCH: normal mood        08 Apr 2020 07:01  -  09 Apr 2020 07:00  --------------------------------------------------------  IN: 0 mL / OUT: 600 mL / NET: -600 mL        MEDICATIONS  (STANDING):  ascorbic acid 1000 milliGRAM(s) Oral daily  azithromycin   Tablet 500 milliGRAM(s) Oral daily  cefTRIAXone   IVPB 1000 milliGRAM(s) IV Intermittent every 24 hours  enoxaparin Injectable 40 milliGRAM(s) SubCutaneous two times a day  hydroxychloroquine   Oral   hydroxychloroquine 200 milliGRAM(s) Oral every 12 hours  losartan 50 milliGRAM(s) Oral daily  thiamine 200 milliGRAM(s) Oral daily    MEDICATIONS  (PRN):  acetaminophen   Tablet .. 650 milliGRAM(s) Oral every 4 hours PRN Temp greater or equal to 38.5C (101.3F)  acetaminophen  Suppository .. 650 milliGRAM(s) Rectal every 4 hours PRN Temp greater or equal to 38.5C (101.3F)  ALBUTerol    90 MICROgram(s) HFA Inhaler 2 Puff(s) Inhalation every 4 hours PRN Shortness of Breath and/or Wheezing  benzonatate 100 milliGRAM(s) Oral three times a day PRN Cough

## 2020-04-10 LAB
ALBUMIN SERPL ELPH-MCNC: 2.8 G/DL — LOW (ref 3.3–5.2)
ALP SERPL-CCNC: 98 U/L — SIGNIFICANT CHANGE UP (ref 40–120)
ALT FLD-CCNC: 91 U/L — HIGH
ANION GAP SERPL CALC-SCNC: 15 MMOL/L — SIGNIFICANT CHANGE UP (ref 5–17)
AST SERPL-CCNC: 61 U/L — HIGH
BILIRUB SERPL-MCNC: 1.4 MG/DL — SIGNIFICANT CHANGE UP (ref 0.4–2)
BUN SERPL-MCNC: 17 MG/DL — SIGNIFICANT CHANGE UP (ref 8–20)
CALCIUM SERPL-MCNC: 8.7 MG/DL — SIGNIFICANT CHANGE UP (ref 8.6–10.2)
CHLORIDE SERPL-SCNC: 92 MMOL/L — LOW (ref 98–107)
CO2 SERPL-SCNC: 23 MMOL/L — SIGNIFICANT CHANGE UP (ref 22–29)
CREAT SERPL-MCNC: 0.68 MG/DL — SIGNIFICANT CHANGE UP (ref 0.5–1.3)
GLUCOSE SERPL-MCNC: 101 MG/DL — HIGH (ref 70–99)
HCT VFR BLD CALC: 40.3 % — SIGNIFICANT CHANGE UP (ref 39–50)
HGB BLD-MCNC: 13.8 G/DL — SIGNIFICANT CHANGE UP (ref 13–17)
LEGIONELLA AG UR QL: NEGATIVE — SIGNIFICANT CHANGE UP
MAGNESIUM SERPL-MCNC: 2.4 MG/DL — SIGNIFICANT CHANGE UP (ref 1.6–2.6)
MCHC RBC-ENTMCNC: 31.6 PG — SIGNIFICANT CHANGE UP (ref 27–34)
MCHC RBC-ENTMCNC: 34.2 GM/DL — SIGNIFICANT CHANGE UP (ref 32–36)
MCV RBC AUTO: 92.2 FL — SIGNIFICANT CHANGE UP (ref 80–100)
PHOSPHATE SERPL-MCNC: 3.4 MG/DL — SIGNIFICANT CHANGE UP (ref 2.4–4.7)
PLATELET # BLD AUTO: 525 K/UL — HIGH (ref 150–400)
POTASSIUM SERPL-MCNC: 4.8 MMOL/L — SIGNIFICANT CHANGE UP (ref 3.5–5.3)
POTASSIUM SERPL-SCNC: 4.8 MMOL/L — SIGNIFICANT CHANGE UP (ref 3.5–5.3)
PROT SERPL-MCNC: 7.1 G/DL — SIGNIFICANT CHANGE UP (ref 6.6–8.7)
RBC # BLD: 4.37 M/UL — SIGNIFICANT CHANGE UP (ref 4.2–5.8)
RBC # FLD: 13.1 % — SIGNIFICANT CHANGE UP (ref 10.3–14.5)
SODIUM SERPL-SCNC: 130 MMOL/L — LOW (ref 135–145)
WBC # BLD: 9.51 K/UL — SIGNIFICANT CHANGE UP (ref 3.8–10.5)
WBC # FLD AUTO: 9.51 K/UL — SIGNIFICANT CHANGE UP (ref 3.8–10.5)

## 2020-04-10 PROCEDURE — 71045 X-RAY EXAM CHEST 1 VIEW: CPT | Mod: 26

## 2020-04-10 PROCEDURE — 99223 1ST HOSP IP/OBS HIGH 75: CPT

## 2020-04-10 PROCEDURE — 99232 SBSQ HOSP IP/OBS MODERATE 35: CPT

## 2020-04-10 RX ADMIN — AZITHROMYCIN 500 MILLIGRAM(S): 500 TABLET, FILM COATED ORAL at 11:45

## 2020-04-10 RX ADMIN — Medication 200 MILLIGRAM(S): at 09:53

## 2020-04-10 RX ADMIN — Medication 1000 MILLIGRAM(S): at 11:45

## 2020-04-10 RX ADMIN — Medication 200 MILLIGRAM(S): at 23:56

## 2020-04-10 RX ADMIN — CEFTRIAXONE 100 MILLIGRAM(S): 500 INJECTION, POWDER, FOR SOLUTION INTRAMUSCULAR; INTRAVENOUS at 06:41

## 2020-04-10 RX ADMIN — Medication 90 MILLIGRAM(S): at 17:57

## 2020-04-10 RX ADMIN — Medication 200 MILLIGRAM(S): at 11:45

## 2020-04-10 RX ADMIN — ENOXAPARIN SODIUM 40 MILLIGRAM(S): 100 INJECTION SUBCUTANEOUS at 09:53

## 2020-04-10 RX ADMIN — LOSARTAN POTASSIUM 50 MILLIGRAM(S): 100 TABLET, FILM COATED ORAL at 06:41

## 2020-04-10 RX ADMIN — ENOXAPARIN SODIUM 40 MILLIGRAM(S): 100 INJECTION SUBCUTANEOUS at 20:52

## 2020-04-10 NOTE — CONSULT NOTE ADULT - SUBJECTIVE AND OBJECTIVE BOX
PULMONARY CONSULT NOTE      LARRY SHRESTHAARABELLA-[id number]    Patient is a 60y old  Male who presents with a chief complaint of SOB/cough (09 Apr 2020 16:58)      INTERVAL HPI/OVERNIGHT EVENTS:Reason for Admission: SOB/cough  History of Present Illness:   A 60 y.o. Male with PMHx significant for HTN, HDL presents c/o shortness of breath for three days. Patient reports his symptoms started 5-6 days ago, and initially presented with back pain/muscle aches, headaches, dry cough and subjective fever, poor apatite. Patient reports, he called his PMD who advised him to take Tylenol for his symptoms and self quarantine. Today SOB worsened, noted also chest tightness worsened by ambulation  Denies any sick contacts, recent history of travel. Denies chest pain, nausea, vomiting.   At the ED noted to be hypoxic sating 88% on RA, was placed on 5 Lts of O2 via NC, mild fever 100.4, CXR: bilateral interstitial lung opacities.    In hosp covid neg x 3.  Currently on 2l/min NC with sat 92.  Received steroids,abx, HC    MEDICATIONS  (STANDING):  ascorbic acid 1000 milliGRAM(s) Oral daily  azithromycin   Tablet 500 milliGRAM(s) Oral daily  cefTRIAXone   IVPB 1000 milliGRAM(s) IV Intermittent every 24 hours  enoxaparin Injectable 40 milliGRAM(s) SubCutaneous two times a day  hydroxychloroquine   Oral   hydroxychloroquine 200 milliGRAM(s) Oral every 12 hours  losartan 50 milliGRAM(s) Oral daily  methylPREDNISolone sodium succinate Injectable 90 milliGRAM(s) IV Push every 12 hours  thiamine 200 milliGRAM(s) Oral daily      MEDICATIONS  (PRN):  acetaminophen   Tablet .. 650 milliGRAM(s) Oral every 4 hours PRN Temp greater or equal to 38.5C (101.3F)  acetaminophen  Suppository .. 650 milliGRAM(s) Rectal every 4 hours PRN Temp greater or equal to 38.5C (101.3F)  ALBUTerol    90 MICROgram(s) HFA Inhaler 2 Puff(s) Inhalation every 4 hours PRN Shortness of Breath and/or Wheezing  benzonatate 100 milliGRAM(s) Oral three times a day PRN Cough      Allergies    No Known Allergies    Intolerances        PAST MEDICAL & SURGICAL HISTORY:  HLD (hyperlipidemia)  HTN (hypertension)  No significant past surgical history      FAMILY HISTORY:  No pertinent family history in first degree relatives      SOCIAL HISTORY  Smoking History: nonsmoker    REVIEW OF SYSTEMS:    CONSTITUTIONAL:  As per HPI.    HEENT:  Eyes:  No diplopia or blurred vision. ENT:  No earache, sore throat or runny nose.    CARDIOVASCULAR:  No pressure, squeezing, tightness, heaviness or aching about the chest; no palpitations.    RESPIRATORY:  Per HPI    GASTROINTESTINAL:  No nausea, vomiting or diarrhea.    GENITOURINARY:  No dysuria, frequency or urgency.    MUSCULOSKELETAL:  No joint pains    SKIN:  No new lesions.    NEUROLOGIC:  No paresthesias, fasciculations, seizures or weakness.    PSYCHIATRIC:  No disorder of thought or mood.    ENDOCRINE:  No heat or cold intolerance, polyuria or polydipsia.    HEMATOLOGICAL:  No easy bruising or bleeding.     Vital Signs Last 24 Hrs  T(C): 37.1 (10 Apr 2020 09:06), Max: 37.4 (09 Apr 2020 22:28)  T(F): 98.7 (10 Apr 2020 09:06), Max: 99.3 (09 Apr 2020 22:28)  HR: 97 (10 Apr 2020 11:48) (77 - 97)  BP: 140/75 (10 Apr 2020 09:06) (130/75 - 143/87)  BP(mean): --  RR: 18 (10 Apr 2020 11:48) (18 - 22)  SpO2: 95% (10 Apr 2020 11:48) (88% - 95%)    PHYSICAL EXAMINATION:    GENERAL: The patient is a well-developed, well-nourished __HM___in no apparent distress.     HEENT: Head is normocephalic and atraumatic. Extraocular muscles are intact. Mucous membranes are moist.     NECK: Supple.     LUNGS: Clear to auscultation without wheezing, rales, or rhonchi. Respirations unlabored    HEART: Regular rate and rhythm without murmur.    ABDOMEN: Soft, nontender, and nondistended.  No hepatosplenomegaly is noted.    EXTREMITIES: Without any cyanosis, clubbing, rash, lesions or edema.    NEUROLOGIC: Grossly intact.    SKIN: No ulceration or induration present.      LABS:    Comprehensive Metabolic Panel (04.07.20 @ 12:25)    Sodium, Serum: 132 mmol/L    Potassium, Serum: 4.9 mmol/L    Chloride, Serum: 94 mmol/L    Carbon Dioxide, Serum: 24.0 mmol/L    Anion Gap, Serum: 14 mmol/L    Blood Urea Nitrogen, Serum: 18.0 mg/dL    Creatinine, Serum: 0.71 mg/dL    Glucose, Serum: 165: Reference Range for Glucose has been amended as of 1/21/2020 mg/dL    Calcium, Total Serum: 9.0 mg/dL    Protein Total, Serum: 7.5 g/dL    Albumin, Serum: 3.4 g/dL    Bilirubin Total, Serum: 0.7 mg/dL    Alkaline Phosphatase, Serum: 82 U/L    Aspartate Aminotransferase (AST/SGOT): 78 U/L    Alanine Aminotransferase (ALT/SGPT): 79 U/L    eGFR if Non : 103: Interpretative comment  The units for eGFR are mL/min/1.73M2 (normalized body surface area). The  eGFR is calculated from a serum creatinine using the CKD-EPI equation.  Other variables required for calculation are race, age and sex. Among  patients with chronic kidney disease (CKD), the eGFR is useful in  determining the stage of disease according to KDOQI CKD classification.  All eGFR results are reported numerically with the following  interpretation.          GFR                    With                 Without     (ml/min/1.73 m2)    Kidney Damage       Kidney Damage        >= 90                    Stage 1                     Normal        60-89                    Stage 2                     Decreased GFR        30-59     Stage 3                     Stage 3        15-29                    Stage 4                     Stage 4        < 15                      Stage 5                     Stage 5  Each stage of CKD assumes that the associated GFR level has been in  effect for at least 3 months. Determination of stages one and two (with  eGFR > 59 ml/min/m2) requires estimation of kidney damage for at least 3  months as defined by structural or functional abnormalities.  Limitations: All estimates of GFR will be less accurate for patients at  extremes of muscle mass (including but not limited to frail elderly,  critically ill, or cancer patients), those with unusual diets, and those  with conditions associated with reduced secretion or extrarenal  elimination of creatinine. The eGFR equation is not recommended for use  in patients with unstable creatinine levels. mL/min/1.73M2    eGFR if African American: 119 mL/min/1.73M2                Complete Blood Count + Automated Diff (04.07.20 @ 12:25)    WBC Count: 7.25: Specimen Integrity Verified. specimen checked for clots- no clots K/uL    RBC Count: 5.04 M/uL    Hemoglobin: 15.2 g/dL    Hematocrit: 46.1 %    Mean Cell Volume: 91.5 fl    Mean Cell Hemoglobin: 30.2 pg    Mean Cell Hemoglobin Conc: 33.0 gm/dL    Red Cell Distrib Width: 13.0 %    Platelet Count - Automated: 342 K/uL    Auto Neutrophil #: 6.91 K/uL    Auto Lymphocyte #: 0.27 K/uL    Auto Monocyte #: 0.07 K/uL    Auto Eosinophil #: 0.00 K/uL    Auto Basophil #: 0.00 K/uL    Auto Neutrophil %: 93.6: Differential percentages must be correlated with absolute numbers for  clinical significance. %    Auto Lymphocyte %: 3.7 %    Auto Monocyte %: 1.0 %    Auto Eosinophil %: 0.0 %    Auto Basophil %: 0.0 %  Ferritin, Serum in AM (04.09.20 @ 12:39)    Ferritin, Serum: 2335 ng/mL  C-Reactive Protein, Serum (04.09.20 @ 12:39)    C-Reactive Protein, Serum: 19.34 mg/dL                    MICROBIOLOGY:COVID-19 PCR . (04.08.20 @ 22:09)    COVID-19 PCR: NotDetec: This test has been validated by [employer] to be accurate;  though it has not been FDA cleared/approved by the usual pathway.  As with all laboratory tests, results should be correlated with clinical  findings.  https://www.fda.gov/media/654406/download  https://www.fda.gov/media/243794/download    Rapid Respiratory Viral Panel (04.08.20 @ 06:30)    Rapid RVP Result: NotDetec: This is NOT your COVID-19 test result. Separate COVID-19 report may  follow.  This Respiratory Panel uses polymerase chain reaction (PCR) to detect for  adenovirus; coronavirus (HKU1, NL63, 229E, OC43); human metapneumovirus  (hMPV); human enterovirus/rhinovirus (Entero/RV); influenza A; influenza  A/H1; influenza A/H3; influenza A/H1-2009; influenza B; parainfluenza  viruses 1, 2, 3, 4; respiratory syncytial virus; Mycoplasma pneumoniae;  and Chlamydophila pneumoniae.          RADIOLOGY & ADDITIONAL STUDIES:< from: CT Chest w/ IV Cont (04.08.20 @ 18:55) >    Impression: No pulmonary embolus is noted within the main, right and left main and lobar pulmonary artery branches bilaterally. Evaluationof the segmental and subsegmental pulmonary artery branches bilaterally is limited due to poor opacification.    Extensive groundglass opacities are noted throughout both lungs. The finding is consistent with the patient's known history of viral pneumonia.    < end of copied text >

## 2020-04-10 NOTE — CONSULT NOTE ADULT - ASSESSMENT
Imp--adriana GG inf  clinically COVID though swab neg x 3, RVP neg.  Other labs are c/w COVID.  Pt is clinically improving.  Plan--would taper steroids.  Abx per ID.  Decrease O2 as adeline.  Will need to see in office after d/c to evaluate lung function,determine if further w/u needed.

## 2020-04-10 NOTE — PROGRESS NOTE ADULT - SUBJECTIVE AND OBJECTIVE BOX
LARRY HENRIETTA    [id number]    60y      Male    Patient is a 60y old  Male who presents with a chief complaint of SOB/cough (10 Apr 2020 12:18)      INTERVAL HPI/OVERNIGHT EVENTS:    Patient is feeling some imprisonment of SOB, his 3rd COVID 19 came negative, has no fever, chills    REVIEW OF SYSTEMS:    CONSTITUTIONAL: No fever, fatigue  RESPIRATORY: No cough, No shortness of breath  CARDIOVASCULAR: No chest pain, palpitations  GASTROINTESTINAL: No abdominal, No nausea, vomiting  NEUROLOGICAL: No headaches,  loss of strength.  MISCELLANEOUS: No joint swelling or pain        Vital Signs Last 24 Hrs  T(C): 37.1 (10 Apr 2020 09:06), Max: 37.4 (09 Apr 2020 22:28)  T(F): 98.7 (10 Apr 2020 09:06), Max: 99.3 (09 Apr 2020 22:28)  HR: 97 (10 Apr 2020 11:48) (77 - 97)  BP: 140/75 (10 Apr 2020 09:06) (130/75 - 143/87)  RR: 18 (10 Apr 2020 11:48) (18 - 22)  SpO2: 95% (10 Apr 2020 11:48) (88% - 95%)    PHYSICAL EXAM:  GENERAL: Middle age  male looking comfortable   HEENT: PERRL, +EOMI  NECK: soft, Supple, No JVD,   CHEST/LUNG: Decrease air entry bilaterally; fine crackles b/l, No wheezing  HEART: S1S2+, Regular rate and rhythm; No murmurs  ABDOMEN: Soft, Nontender, Nondistended; Bowel sounds present  EXTREMITIES:  1+ Peripheral Pulses, No edema  SKIN: No rashes or lesions  NEURO: AAOX3, no focal deficits, no motor r sensory loss  PSYCH: normal mood      LABS:                        13.8   9.51  )-----------( 525      ( 10 Apr 2020 13:14 )             40.3                   I&O's Summary      MEDICATIONS  (STANDING):  ascorbic acid 1000 milliGRAM(s) Oral daily  azithromycin   Tablet 500 milliGRAM(s) Oral daily  cefTRIAXone   IVPB 1000 milliGRAM(s) IV Intermittent every 24 hours  enoxaparin Injectable 40 milliGRAM(s) SubCutaneous two times a day  hydroxychloroquine   Oral   hydroxychloroquine 200 milliGRAM(s) Oral every 12 hours  losartan 50 milliGRAM(s) Oral daily  methylPREDNISolone sodium succinate Injectable 90 milliGRAM(s) IV Push every 12 hours  thiamine 200 milliGRAM(s) Oral daily    MEDICATIONS  (PRN):  acetaminophen   Tablet .. 650 milliGRAM(s) Oral every 4 hours PRN Temp greater or equal to 38.5C (101.3F)  acetaminophen  Suppository .. 650 milliGRAM(s) Rectal every 4 hours PRN Temp greater or equal to 38.5C (101.3F)  ALBUTerol    90 MICROgram(s) HFA Inhaler 2 Puff(s) Inhalation every 4 hours PRN Shortness of Breath and/or Wheezing  benzonatate 100 milliGRAM(s) Oral three times a day PRN Cough

## 2020-04-10 NOTE — PROGRESS NOTE ADULT - SUBJECTIVE AND OBJECTIVE BOX
[facility]  INFECTIOUS DISEASES AND INTERNAL MEDICINE at [place]  =======================================================  Booker Mancilla MD  Diplomates American Board of Internal Medicine and Infectious Diseases  Tel: [phone]      Fax: [phone]  =======================================================    LARRY SHRESTHA [id number]    Follow up: covid 19  still hypoxic requiring O2    Allergies:  No Known Allergies      Medications:  acetaminophen   Tablet .. 650 milliGRAM(s) Oral every 4 hours PRN  acetaminophen  Suppository .. 650 milliGRAM(s) Rectal every 4 hours PRN  ALBUTerol    90 MICROgram(s) HFA Inhaler 2 Puff(s) Inhalation every 4 hours PRN  ascorbic acid 1000 milliGRAM(s) Oral daily  azithromycin   Tablet 500 milliGRAM(s) Oral daily  benzonatate 100 milliGRAM(s) Oral three times a day PRN  cefTRIAXone   IVPB 1000 milliGRAM(s) IV Intermittent every 24 hours  enoxaparin Injectable 40 milliGRAM(s) SubCutaneous two times a day  hydroxychloroquine   Oral   hydroxychloroquine 200 milliGRAM(s) Oral every 12 hours  losartan 50 milliGRAM(s) Oral daily  methylPREDNISolone sodium succinate Injectable 90 milliGRAM(s) IV Push every 12 hours  thiamine 200 milliGRAM(s) Oral daily            REVIEW OF SYSTEMS:  CONSTITUTIONAL:  No Fever or chills  HEENT:   No diplopia or blurred vision.  No earache, sore throat or runny nose.  CARDIOVASCULAR:  No pressure, squeezing, strangling, tightness, heaviness or aching about the chest, neck, axilla or epigastrium.  RESPIRATORY:  No cough, shortness of breath  GASTROINTESTINAL:  No nausea, vomiting or diarrhea.  GENITOURINARY:  No dysuria, frequency or urgency. No Blood in urine  MUSCULOSKELETAL:  no joint aches, no muscle pain  SKIN:  No change in skin, hair or nails.  NEUROLOGIC:  No Headaches, seizures or weakness.  PSYCHIATRIC:  No disorder of thought or mood.  ENDOCRINE:  No heat or cold intolerance  HEMATOLOGICAL:  No easy bruising or bleeding.            Physical Exam:  ICU Vital Signs Last 24 Hrs  T(C): 37.1 (10 Apr 2020 09:06), Max: 37.4 (09 Apr 2020 22:28)  T(F): 98.7 (10 Apr 2020 09:06), Max: 99.3 (09 Apr 2020 22:28)  HR: 97 (10 Apr 2020 11:48) (77 - 97)  BP: 140/75 (10 Apr 2020 09:06) (130/75 - 143/87)  BP(mean): --  ABP: --  ABP(mean): --  RR: 18 (10 Apr 2020 11:48) (18 - 22)  SpO2: 95% (10 Apr 2020 11:48) (88% - 95%)      GEN: NAD, pleasant prone on O2  HEENT: normocephalic and atraumatic. EOMI. PERRL.  Anicteric   NECK: Supple.   LUNGS: Clear to auscultation.  HEART: Regular rate and rhythm without murmur.  ABDOMEN: Soft, nontender, and nondistended.  Positive bowel sounds.    : No CVA tenderness  EXTREMITIES: Without any edema.  MSK: no joint swelling  NEUROLOGIC: Cranial nerves II through XII are grossly intact. No focal deficits  PSYCHIATRIC: Appropriate affect .  SKIN: No Rash      Labs:

## 2020-04-11 LAB
ANION GAP SERPL CALC-SCNC: 13 MMOL/L — SIGNIFICANT CHANGE UP (ref 5–17)
BUN SERPL-MCNC: 24 MG/DL — HIGH (ref 8–20)
CALCIUM SERPL-MCNC: 8.9 MG/DL — SIGNIFICANT CHANGE UP (ref 8.6–10.2)
CHLORIDE SERPL-SCNC: 95 MMOL/L — LOW (ref 98–107)
CO2 SERPL-SCNC: 23 MMOL/L — SIGNIFICANT CHANGE UP (ref 22–29)
CREAT SERPL-MCNC: 0.74 MG/DL — SIGNIFICANT CHANGE UP (ref 0.5–1.3)
GLUCOSE SERPL-MCNC: 195 MG/DL — HIGH (ref 70–99)
HCT VFR BLD CALC: 42.1 % — SIGNIFICANT CHANGE UP (ref 39–50)
HGB BLD-MCNC: 14.3 G/DL — SIGNIFICANT CHANGE UP (ref 13–17)
MCHC RBC-ENTMCNC: 31 PG — SIGNIFICANT CHANGE UP (ref 27–34)
MCHC RBC-ENTMCNC: 34 GM/DL — SIGNIFICANT CHANGE UP (ref 32–36)
MCV RBC AUTO: 91.3 FL — SIGNIFICANT CHANGE UP (ref 80–100)
PLATELET # BLD AUTO: 590 K/UL — HIGH (ref 150–400)
POTASSIUM SERPL-MCNC: 4.3 MMOL/L — SIGNIFICANT CHANGE UP (ref 3.5–5.3)
POTASSIUM SERPL-SCNC: 4.3 MMOL/L — SIGNIFICANT CHANGE UP (ref 3.5–5.3)
RBC # BLD: 4.61 M/UL — SIGNIFICANT CHANGE UP (ref 4.2–5.8)
RBC # FLD: 13 % — SIGNIFICANT CHANGE UP (ref 10.3–14.5)
SODIUM SERPL-SCNC: 131 MMOL/L — LOW (ref 135–145)
WBC # BLD: 12.15 K/UL — HIGH (ref 3.8–10.5)
WBC # FLD AUTO: 12.15 K/UL — HIGH (ref 3.8–10.5)

## 2020-04-11 PROCEDURE — 99232 SBSQ HOSP IP/OBS MODERATE 35: CPT

## 2020-04-11 RX ADMIN — ENOXAPARIN SODIUM 40 MILLIGRAM(S): 100 INJECTION SUBCUTANEOUS at 09:46

## 2020-04-11 RX ADMIN — AZITHROMYCIN 500 MILLIGRAM(S): 500 TABLET, FILM COATED ORAL at 11:42

## 2020-04-11 RX ADMIN — Medication 200 MILLIGRAM(S): at 09:46

## 2020-04-11 RX ADMIN — Medication 1000 MILLIGRAM(S): at 11:42

## 2020-04-11 RX ADMIN — CEFTRIAXONE 100 MILLIGRAM(S): 500 INJECTION, POWDER, FOR SOLUTION INTRAMUSCULAR; INTRAVENOUS at 07:35

## 2020-04-11 RX ADMIN — Medication 200 MILLIGRAM(S): at 11:42

## 2020-04-11 RX ADMIN — Medication 90 MILLIGRAM(S): at 17:45

## 2020-04-11 RX ADMIN — LOSARTAN POTASSIUM 50 MILLIGRAM(S): 100 TABLET, FILM COATED ORAL at 07:36

## 2020-04-11 RX ADMIN — Medication 90 MILLIGRAM(S): at 07:36

## 2020-04-11 RX ADMIN — ENOXAPARIN SODIUM 40 MILLIGRAM(S): 100 INJECTION SUBCUTANEOUS at 22:00

## 2020-04-11 NOTE — PROGRESS NOTE ADULT - SUBJECTIVE AND OBJECTIVE BOX
[facility]  INFECTIOUS DISEASES AND INTERNAL MEDICINE at [place]  =======================================================  Booker Mancilla MD  Diplomates American Board of Internal Medicine and Infectious Diseases  Tel: [phone]      Fax: [phone]  =======================================================    LARRY SHRESTHA [id number]    Follow up: presumed COVID 19  says breathing better but now on VM + NC    Allergies:  No Known Allergies      Medications:  acetaminophen   Tablet .. 650 milliGRAM(s) Oral every 4 hours PRN  acetaminophen  Suppository .. 650 milliGRAM(s) Rectal every 4 hours PRN  ALBUTerol    90 MICROgram(s) HFA Inhaler 2 Puff(s) Inhalation every 4 hours PRN  ascorbic acid 1000 milliGRAM(s) Oral daily  benzonatate 100 milliGRAM(s) Oral three times a day PRN  cefTRIAXone   IVPB 1000 milliGRAM(s) IV Intermittent every 24 hours  enoxaparin Injectable 40 milliGRAM(s) SubCutaneous two times a day  losartan 50 milliGRAM(s) Oral daily  methylPREDNISolone sodium succinate Injectable 90 milliGRAM(s) IV Push every 12 hours  thiamine 200 milliGRAM(s) Oral daily            REVIEW OF SYSTEMS:  CONSTITUTIONAL:  No Fever or chills  HEENT:   No diplopia or blurred vision.  No earache, sore throat or runny nose.  CARDIOVASCULAR:  No pressure, squeezing, strangling, tightness, heaviness or aching about the chest, neck, axilla or epigastrium.  RESPIRATORY:  No cough, shortness of breath  GASTROINTESTINAL:  No nausea, vomiting or diarrhea.  GENITOURINARY:  No dysuria, frequency or urgency. No Blood in urine  MUSCULOSKELETAL:  no joint aches, no muscle pain  SKIN:  No change in skin, hair or nails.  NEUROLOGIC:  No Headaches, seizures or weakness.  PSYCHIATRIC:  No disorder of thought or mood.  ENDOCRINE:  No heat or cold intolerance  HEMATOLOGICAL:  No easy bruising or bleeding.            Physical Exam:  ICU Vital Signs Last 24 Hrs  T(C): 36.7 (11 Apr 2020 09:40), Max: 36.7 (11 Apr 2020 09:40)  T(F): 98.1 (11 Apr 2020 09:40), Max: 98.1 (11 Apr 2020 09:40)  HR: 76 (11 Apr 2020 11:24) (71 - 100)  BP: 121/72 (11 Apr 2020 09:40) (121/72 - 136/79)  BP(mean): --  ABP: --  ABP(mean): --  RR: 18 (11 Apr 2020 11:24) (18 - 22)  SpO2: 96% (11 Apr 2020 11:24) (81% - 96%)      GEN: NAD, pleasant on VM + NC  HEENT: normocephalic and atraumatic. EOMI. PERRL.  Anicteric   NECK: Supple.   LUNGS: Clear to auscultation.  HEART: Regular rate and rhythm without murmur.  ABDOMEN: Soft, nontender, and nondistended.  Positive bowel sounds.    : No CVA tenderness  EXTREMITIES: Without any edema.  MSK: no joint swelling  NEUROLOGIC: Cranial nerves II through XII are grossly intact. No focal deficits  PSYCHIATRIC: Appropriate affect .  SKIN: No Rash      Labs:                          13.8   9.51  )-----------( 525      ( 10 Apr 2020 13:14 )             40.3   04-10    130<L>  |  92<L>  |  17.0  ----------------------------<  101<H>  4.8   |  23.0  |  0.68    Ca    8.7      10 Apr 2020 13:14  Phos  3.4     04-10  Mg     2.4     04-10    TPro  7.1  /  Alb  2.8<L>  /  TBili  1.4  /  DBili  x   /  AST  61<H>  /  ALT  91<H>  /  AlkPhos  98  04-10

## 2020-04-11 NOTE — PROGRESS NOTE ADULT - SUBJECTIVE AND OBJECTIVE BOX
LARRY HENRIETTA    [id number]    60y      Male    Patient is a 60y old  Male who presents with a chief complaint of SOB/cough (10 Apr 2020 12:18)      INTERVAL HPI/OVERNIGHT EVENTS:  No acute events overnight  Patient is feeling some improvement in C/O SOB  His 3rd COVID 19 test negative, has no fever, chills  Appetite good, denies abd pain, nausea, vomiting      Vital Signs Last 24 Hrs  T(C): 36.7 (11 Apr 2020 09:40), Max: 36.7 (11 Apr 2020 09:40)  T(F): 98.1 (11 Apr 2020 09:40), Max: 98.1 (11 Apr 2020 09:40)  HR: 93 (11 Apr 2020 13:52) (71 - 100)  BP: 121/72 (11 Apr 2020 09:40) (121/72 - 136/79)  BP(mean): --  RR: 18 (11 Apr 2020 11:24) (18 - 22)  SpO2: 92% (11 Apr 2020 13:52) (81% - 96%)    PHYSICAL EXAM:  GENERAL: Middle age  male looking comfortable   CHEST/LUNG: Decrease air entry bilaterally; fine crackles b/l, No wheezing  HEART: S1S2+, Regular rate and rhythm; No murmurs  ABDOMEN: Soft, Nontender, Nondistended; Bowel sounds present  EXTREMITIES:  1+ Peripheral Pulses, No edema      LABS:                                   13.8   9.51  )-----------( 525      ( 10 Apr 2020 13:14 )             40.3   04-10    130<L>  |  92<L>  |  17.0  ----------------------------<  101<H>  4.8   |  23.0  |  0.68    Ca    8.7      10 Apr 2020 13:14  Phos  3.4     04-10  Mg     2.4     04-10    TPro  7.1  /  Alb  2.8<L>  /  TBili  1.4  /  DBili  x   /  AST  61<H>  /  ALT  91<H>  /  AlkPhos  98  04-10    MEDICATIONS  (STANDING):  ascorbic acid 1000 milliGRAM(s) Oral daily  cefTRIAXone   IVPB 1000 milliGRAM(s) IV Intermittent every 24 hours  enoxaparin Injectable 40 milliGRAM(s) SubCutaneous two times a day  losartan 50 milliGRAM(s) Oral daily  methylPREDNISolone sodium succinate Injectable 90 milliGRAM(s) IV Push every 12 hours  thiamine 200 milliGRAM(s) Oral daily    MEDICATIONS  (PRN):  acetaminophen   Tablet .. 650 milliGRAM(s) Oral every 4 hours PRN Temp greater or equal to 38.5C (101.3F)  acetaminophen  Suppository .. 650 milliGRAM(s) Rectal every 4 hours PRN Temp greater or equal to 38.5C (101.3F)  ALBUTerol    90 MICROgram(s) HFA Inhaler 2 Puff(s) Inhalation every 4 hours PRN Shortness of Breath and/or Wheezing  benzonatate 100 milliGRAM(s) Oral three times a day PRN Cough LARRY HENRIETTA    [id number]    60y      Male    Patient is a 60y old  Male who presents with a chief complaint of SOB/cough (10 Apr 2020 12:18)      INTERVAL HPI/OVERNIGHT EVENTS:    No acute events overnight  Patient is feeling some improvement in C/O SOB  His 3rd COVID 19 test negative, has no fever, chills  Appetite good, denies abd pain, nausea, vomiting      Vital Signs Last 24 Hrs  T(C): 36.7 (11 Apr 2020 09:40), Max: 36.7 (11 Apr 2020 09:40)  T(F): 98.1 (11 Apr 2020 09:40), Max: 98.1 (11 Apr 2020 09:40)  HR: 93 (11 Apr 2020 13:52) (71 - 100)  BP: 121/72 (11 Apr 2020 09:40) (121/72 - 136/79)  BP(mean): --  RR: 18 (11 Apr 2020 11:24) (18 - 22)  SpO2: 92% (11 Apr 2020 13:52) (81% - 96%)    PHYSICAL EXAM:  GENERAL: Middle age  male looking comfortable   CHEST/LUNG: Decrease air entry bilaterally; fine crackles b/l, No wheezing  HEART: S1S2+, Regular rate and rhythm; No murmurs  ABDOMEN: Soft, Nontender, Nondistended; Bowel sounds present  EXTREMITIES:  1+ Peripheral Pulses, No edema      LABS:                                   13.8   9.51  )-----------( 525      ( 10 Apr 2020 13:14 )             40.3   04-10    130<L>  |  92<L>  |  17.0  ----------------------------<  101<H>  4.8   |  23.0  |  0.68    Ca    8.7      10 Apr 2020 13:14  Phos  3.4     04-10  Mg     2.4     04-10    TPro  7.1  /  Alb  2.8<L>  /  TBili  1.4  /  DBili  x   /  AST  61<H>  /  ALT  91<H>  /  AlkPhos  98  04-10    MEDICATIONS  (STANDING):  ascorbic acid 1000 milliGRAM(s) Oral daily  cefTRIAXone   IVPB 1000 milliGRAM(s) IV Intermittent every 24 hours  enoxaparin Injectable 40 milliGRAM(s) SubCutaneous two times a day  losartan 50 milliGRAM(s) Oral daily  methylPREDNISolone sodium succinate Injectable 90 milliGRAM(s) IV Push every 12 hours  thiamine 200 milliGRAM(s) Oral daily    MEDICATIONS  (PRN):  acetaminophen   Tablet .. 650 milliGRAM(s) Oral every 4 hours PRN Temp greater or equal to 38.5C (101.3F)  acetaminophen  Suppository .. 650 milliGRAM(s) Rectal every 4 hours PRN Temp greater or equal to 38.5C (101.3F)  ALBUTerol    90 MICROgram(s) HFA Inhaler 2 Puff(s) Inhalation every 4 hours PRN Shortness of Breath and/or Wheezing  benzonatate 100 milliGRAM(s) Oral three times a day PRN Cough

## 2020-04-11 NOTE — PROGRESS NOTE ADULT - ATTENDING COMMENTS
Patient is seen and evaluated, case discussed with NP, agree with assessment and plan  Patient is doing better, as per him his breathing is improving  Decrease air entry b/l   will continue with Supplemental oxygen  Ceftrixone and Plaquenil, self proning and IS encouraged Patient is seen and evaluated, case discussed with NP, agree with assessment and plan  Patient is doing better, as per him his breathing is improving  Decrease air entry b/l   will continue with Supplemental oxygen  Ceftrixone, self proning and IS encouraged

## 2020-04-12 PROCEDURE — 99232 SBSQ HOSP IP/OBS MODERATE 35: CPT

## 2020-04-12 RX ADMIN — CEFTRIAXONE 100 MILLIGRAM(S): 500 INJECTION, POWDER, FOR SOLUTION INTRAMUSCULAR; INTRAVENOUS at 05:22

## 2020-04-12 RX ADMIN — Medication 90 MILLIGRAM(S): at 05:22

## 2020-04-12 RX ADMIN — Medication 90 MILLIGRAM(S): at 16:57

## 2020-04-12 RX ADMIN — Medication 200 MILLIGRAM(S): at 10:55

## 2020-04-12 RX ADMIN — ENOXAPARIN SODIUM 40 MILLIGRAM(S): 100 INJECTION SUBCUTANEOUS at 22:40

## 2020-04-12 RX ADMIN — ENOXAPARIN SODIUM 40 MILLIGRAM(S): 100 INJECTION SUBCUTANEOUS at 10:55

## 2020-04-12 RX ADMIN — Medication 1000 MILLIGRAM(S): at 10:55

## 2020-04-12 RX ADMIN — LOSARTAN POTASSIUM 50 MILLIGRAM(S): 100 TABLET, FILM COATED ORAL at 05:22

## 2020-04-12 NOTE — PROGRESS NOTE ADULT - SUBJECTIVE AND OBJECTIVE BOX
LARRY SHRESTHA    [id number]    60y      Male    Patient is a 60y old  Male who presents with a chief complaint of SOB/cough (11 Apr 2020 14:09)      INTERVAL HPI/OVERNIGHT EVENTS:    Patient is feeling improvement of SOB, has no fever, chills, he has been weaned down from the Nonrebreather to Venti mask    REVIEW OF SYSTEMS:    CONSTITUTIONAL: No fever, some fatigue  RESPIRATORY: Improving cough and shortness of breath  CARDIOVASCULAR: No chest pain, palpitations  GASTROINTESTINAL: No abdominal, No nausea, vomiting  NEUROLOGICAL: No headaches,  loss of strength.  MISCELLANEOUS: No joint swelling or pain         Vital Signs Last 24 Hrs  T(C): 36.3 (12 Apr 2020 08:22), Max: 36.7 (11 Apr 2020 21:18)  T(F): 97.4 (12 Apr 2020 08:22), Max: 98 (11 Apr 2020 21:18)  HR: 68 (12 Apr 2020 08:22) (66 - 77)  BP: 136/85 (12 Apr 2020 08:22) (129/77 - 138/75)  RR: 23 (12 Apr 2020 08:22) (18 - 23)  SpO2: 90% (12 Apr 2020 08:22) (90% - 98%)    PHYSICAL EXAM:    GENERAL: Middle age  male looking comfortable   HEENT: PERRL, +EOMI  NECK: soft, Supple, No JVD,   CHEST/LUNG: Decrease air entry bilaterally; No wheezing  HEART: S1S2+, Regular rate and rhythm; No murmurs  ABDOMEN: Soft, Nontender, Nondistended; Bowel sounds present  EXTREMITIES:  1+ Peripheral Pulses, No edema  SKIN: No rashes or lesions  NEURO: AAOX3, no focal deficits, no motor r sensory loss  PSYCH: normal mood    LABS:                        14.3   12.15 )-----------( 590      ( 11 Apr 2020 16:12 )             42.1     04-11    131<L>  |  95<L>  |  24.0<H>  ----------------------------<  195<H>  4.3   |  23.0  |  0.74    Ca    8.9      11 Apr 2020 16:12        MEDICATIONS  (STANDING):  ascorbic acid 1000 milliGRAM(s) Oral daily  cefTRIAXone   IVPB 1000 milliGRAM(s) IV Intermittent every 24 hours  enoxaparin Injectable 40 milliGRAM(s) SubCutaneous two times a day  losartan 50 milliGRAM(s) Oral daily  methylPREDNISolone sodium succinate Injectable 90 milliGRAM(s) IV Push every 12 hours  thiamine 200 milliGRAM(s) Oral daily    MEDICATIONS  (PRN):  acetaminophen   Tablet .. 650 milliGRAM(s) Oral every 4 hours PRN Temp greater or equal to 38.5C (101.3F)  acetaminophen  Suppository .. 650 milliGRAM(s) Rectal every 4 hours PRN Temp greater or equal to 38.5C (101.3F)  ALBUTerol    90 MICROgram(s) HFA Inhaler 2 Puff(s) Inhalation every 4 hours PRN Shortness of Breath and/or Wheezing  benzonatate 100 milliGRAM(s) Oral three times a day PRN Cough

## 2020-04-13 PROCEDURE — 99232 SBSQ HOSP IP/OBS MODERATE 35: CPT

## 2020-04-13 RX ADMIN — Medication 200 MILLIGRAM(S): at 11:39

## 2020-04-13 RX ADMIN — Medication 90 MILLIGRAM(S): at 16:41

## 2020-04-13 RX ADMIN — LOSARTAN POTASSIUM 50 MILLIGRAM(S): 100 TABLET, FILM COATED ORAL at 11:39

## 2020-04-13 RX ADMIN — Medication 1000 MILLIGRAM(S): at 11:39

## 2020-04-13 RX ADMIN — CEFTRIAXONE 100 MILLIGRAM(S): 500 INJECTION, POWDER, FOR SOLUTION INTRAMUSCULAR; INTRAVENOUS at 06:26

## 2020-04-13 RX ADMIN — ENOXAPARIN SODIUM 40 MILLIGRAM(S): 100 INJECTION SUBCUTANEOUS at 21:11

## 2020-04-13 RX ADMIN — Medication 90 MILLIGRAM(S): at 06:27

## 2020-04-13 RX ADMIN — ENOXAPARIN SODIUM 40 MILLIGRAM(S): 100 INJECTION SUBCUTANEOUS at 11:40

## 2020-04-13 NOTE — DIETITIAN INITIAL EVALUATION ADULT. - OBTAIN WEEKLY WEIGHT
Patient states he does not have the dulcolax for his prep. He does have the Miralax on hand still. Dulcolax pending.    Patient r/s to 6/6/19 @ 9:00am yes

## 2020-04-13 NOTE — DIETITIAN INITIAL EVALUATION ADULT. - OTHER INFO
A 60 y.o. Male with PMHx significant for HTN, HDL presents c/o shortness of breath for three days.  Unable to interview pt at this time. Per documentation, pt presented with poor appetite. 75% PO intake documented on 4/12. Encourage PO intake. Generalized edema noted 4/9. RD to remain available.

## 2020-04-13 NOTE — DIETITIAN INITIAL EVALUATION ADULT. - PERTINENT MEDS FT
MEDICATIONS  (STANDING):  ascorbic acid 1000 milliGRAM(s) Oral daily  enoxaparin Injectable 40 milliGRAM(s) SubCutaneous two times a day  losartan 50 milliGRAM(s) Oral daily  methylPREDNISolone sodium succinate Injectable 90 milliGRAM(s) IV Push every 12 hours  thiamine 200 milliGRAM(s) Oral daily    MEDICATIONS  (PRN):  acetaminophen   Tablet .. 650 milliGRAM(s) Oral every 4 hours PRN Temp greater or equal to 38.5C (101.3F)  acetaminophen  Suppository .. 650 milliGRAM(s) Rectal every 4 hours PRN Temp greater or equal to 38.5C (101.3F)  ALBUTerol    90 MICROgram(s) HFA Inhaler 2 Puff(s) Inhalation every 4 hours PRN Shortness of Breath and/or Wheezing  benzonatate 100 milliGRAM(s) Oral three times a day PRN Cough

## 2020-04-13 NOTE — CHART NOTE - NSCHARTNOTEFT_GEN_A_CORE
Upon Nutritional Assessment by the Registered Dietitian your patient was determined to meet criteria / has evidence of the following diagnosis/diagnoses:          [X]  Mild Protein Calorie Malnutrition        [ ]  Moderate Protein Calorie Malnutrition        [ ] Severe Protein Calorie Malnutrition        [ ] Unspecified Protein Calorie Malnutrition        [ ] Underweight / BMI <19        [ ] Morbid Obesity / BMI > 40      Findings as based on:  •  Comprehensive nutrition assessment and consultation  •  Calorie counts (nutrient intake analysis)  •  Food acceptance and intake status from observations by staff  •  Follow up  •  Patient education  •  Intervention secondary to interdisciplinary rounds  •   concerns      Treatment:    The following diet has been recommended:  Dash/TLC  Recommend Ensure BID to optimize PO intake.    PROVIDER Section:     By signing this assessment you are acknowledging and agree with the diagnosis/diagnoses assigned by the Registered Dietitian    Comments:

## 2020-04-13 NOTE — PROGRESS NOTE ADULT - SUBJECTIVE AND OBJECTIVE BOX
LARRY SHRESTHA    [id number]    60y      Male    Patient is a 60y old  Male who presents with a chief complaint of SOB/cough (13 Apr 2020 12:21)      INTERVAL HPI/OVERNIGHT EVENTS:    Patient is feeling improvement of SOB, his cough is also improving, he has no fever, chills, he has been weaned down from the Nonrebreather to Venti mask, has been saturating well.     REVIEW OF SYSTEMS:    CONSTITUTIONAL: No fever, some fatigue  RESPIRATORY: Improving cough and shortness of breath  CARDIOVASCULAR: No chest pain, palpitations  GASTROINTESTINAL: No abdominal, No nausea, vomiting  NEUROLOGICAL: No headaches,  loss of strength  MISCELLANEOUS: No joint swelling or pain.       Vital Signs Last 24 Hrs  T(C): 36.5 (13 Apr 2020 09:44), Max: 36.6 (13 Apr 2020 04:42)  T(F): 97.7 (13 Apr 2020 09:44), Max: 97.8 (13 Apr 2020 04:42)  HR: 111 (13 Apr 2020 09:44) (72 - 111)  BP: 125/77 (13 Apr 2020 09:44) (125/77 - 129/78)  RR: 22 (13 Apr 2020 09:44) (21 - 22)  SpO2: 95% (13 Apr 2020 09:44) (91% - 95%)    PHYSICAL EXAM:      GENERAL: Middle age  male looking comfortable   HEENT: PERRL, +EOMI  NECK: soft, Supple, No JVD   CHEST/LUNG: Decrease air entry bilaterally; No wheezing  HEART: S1S2+, Regular rate and rhythm; No murmurs  ABDOMEN: Soft, Nontender, Nondistended; Bowel sounds present  EXTREMITIES:  1+ Peripheral Pulses, No edema  SKIN: No rashes or lesions  NEURO: AAOX3, no focal deficits, no motor r sensory loss  PSYCH: normal mood      LABS:                        14.3   12.15 )-----------( 590      ( 11 Apr 2020 16:12 )             42.1     04-11    131<L>  |  95<L>  |  24.0<H>  ----------------------------<  195<H>  4.3   |  23.0  |  0.74    Ca    8.9      11 Apr 2020 16:12              I&O's Summary      MEDICATIONS  (STANDING):  ascorbic acid 1000 milliGRAM(s) Oral daily  enoxaparin Injectable 40 milliGRAM(s) SubCutaneous two times a day  losartan 50 milliGRAM(s) Oral daily  methylPREDNISolone sodium succinate Injectable 90 milliGRAM(s) IV Push every 12 hours  thiamine 200 milliGRAM(s) Oral daily    MEDICATIONS  (PRN):  acetaminophen   Tablet .. 650 milliGRAM(s) Oral every 4 hours PRN Temp greater or equal to 38.5C (101.3F)  acetaminophen  Suppository .. 650 milliGRAM(s) Rectal every 4 hours PRN Temp greater or equal to 38.5C (101.3F)  ALBUTerol    90 MICROgram(s) HFA Inhaler 2 Puff(s) Inhalation every 4 hours PRN Shortness of Breath and/or Wheezing  benzonatate 100 milliGRAM(s) Oral three times a day PRN Cough

## 2020-04-13 NOTE — DIETITIAN INITIAL EVALUATION ADULT. - ADD RECOMMEND
Monitor PO intake. Recommend Ensure BID to optimize PO intake. Monitor labs. Monitor weights. Recommend MVI daily.

## 2020-04-13 NOTE — PROGRESS NOTE ADULT - SUBJECTIVE AND OBJECTIVE BOX
[facility]  INFECTIOUS DISEASES AND INTERNAL MEDICINE at [place]  =======================================================  Booker Mancilla MD  Diplomates American Board of Internal Medicine and Infectious Diseases  Tel: [phone]      Fax: [phone]  =======================================================    LARRY SHRESTHA [id number]    Follow up: presumed COVID 19+  afebrile  now on VM 50%   states he is feeling much much better  asking if he can have bedside commode, felt short of breath when he had to go to bathroom at night    Allergies:  No Known Allergies      Medications:  acetaminophen   Tablet .. 650 milliGRAM(s) Oral every 4 hours PRN  acetaminophen  Suppository .. 650 milliGRAM(s) Rectal every 4 hours PRN  ALBUTerol    90 MICROgram(s) HFA Inhaler 2 Puff(s) Inhalation every 4 hours PRN  ascorbic acid 1000 milliGRAM(s) Oral daily  benzonatate 100 milliGRAM(s) Oral three times a day PRN  enoxaparin Injectable 40 milliGRAM(s) SubCutaneous two times a day  losartan 50 milliGRAM(s) Oral daily  methylPREDNISolone sodium succinate Injectable 90 milliGRAM(s) IV Push every 12 hours  thiamine 200 milliGRAM(s) Oral daily            REVIEW OF SYSTEMS:  CONSTITUTIONAL:  No Fever or chills  HEENT:   No diplopia or blurred vision.  No earache, sore throat or runny nose.  CARDIOVASCULAR:  No pressure, squeezing, strangling, tightness, heaviness or aching about the chest, neck, axilla or epigastrium.  RESPIRATORY:  No cough, shortness of breath  GASTROINTESTINAL:  No nausea, vomiting or diarrhea.  GENITOURINARY:  No dysuria, frequency or urgency. No Blood in urine  MUSCULOSKELETAL:  no joint aches, no muscle pain  SKIN:  No change in skin, hair or nails.  NEUROLOGIC:  No Headaches, seizures or weakness.  PSYCHIATRIC:  No disorder of thought or mood.  ENDOCRINE:  No heat or cold intolerance  HEMATOLOGICAL:  No easy bruising or bleeding.            Physical Exam:  ICU Vital Signs Last 24 Hrs  T(C): 36.5 (13 Apr 2020 09:44), Max: 36.6 (13 Apr 2020 04:42)  T(F): 97.7 (13 Apr 2020 09:44), Max: 97.8 (13 Apr 2020 04:42)  HR: 111 (13 Apr 2020 09:44) (72 - 111)  BP: 125/77 (13 Apr 2020 09:44) (125/77 - 129/78)  BP(mean): --  ABP: --  ABP(mean): --  RR: 22 (13 Apr 2020 09:44) (21 - 22)  SpO2: 95% (13 Apr 2020 09:44) (91% - 95%)      GEN: NAD, pleasant on VM  HEENT: normocephalic and atraumatic. EOMI. PERRL.  Anicteric   NECK: Supple.   LUNGS: right sided crackles to auscultation.  HEART: Regular rate and rhythm without murmur.  ABDOMEN: Soft, nontender, and nondistended.  Positive bowel sounds.    : No CVA tenderness  EXTREMITIES: Without any edema.  MSK: no joint swelling  NEUROLOGIC: Cranial nerves II through XII are grossly intact. No focal deficits  PSYCHIATRIC: Appropriate affect .  SKIN: No Rash      Labs:                          14.3   12.15 )-----------( 590      ( 11 Apr 2020 16:12 )             42.1   04-11    131<L>  |  95<L>  |  24.0<H>  ----------------------------<  195<H>  4.3   |  23.0  |  0.74    Ca    8.9      11 Apr 2020 16:12

## 2020-04-14 PROCEDURE — 99232 SBSQ HOSP IP/OBS MODERATE 35: CPT

## 2020-04-14 RX ADMIN — ENOXAPARIN SODIUM 40 MILLIGRAM(S): 100 INJECTION SUBCUTANEOUS at 22:10

## 2020-04-14 RX ADMIN — Medication 45 MILLIGRAM(S): at 17:07

## 2020-04-14 RX ADMIN — Medication 1000 MILLIGRAM(S): at 11:25

## 2020-04-14 RX ADMIN — Medication 90 MILLIGRAM(S): at 04:30

## 2020-04-14 RX ADMIN — LOSARTAN POTASSIUM 50 MILLIGRAM(S): 100 TABLET, FILM COATED ORAL at 04:30

## 2020-04-14 RX ADMIN — ENOXAPARIN SODIUM 40 MILLIGRAM(S): 100 INJECTION SUBCUTANEOUS at 11:25

## 2020-04-14 RX ADMIN — Medication 200 MILLIGRAM(S): at 11:25

## 2020-04-14 NOTE — PROGRESS NOTE ADULT - SUBJECTIVE AND OBJECTIVE BOX
PULMONARY PROGRESS NOTE      [id number]    Patient is a 60y old  Male who presents with a chief complaint of SOB/cough (13 Apr 2020 13:20)      INTERVAL HPI/OVERNIGHT EVENTS:  Feels better  Still with cough but decreased  On 50% VM>observed sats 100%    MEDICATIONS  (STANDING):  ascorbic acid 1000 milliGRAM(s) Oral daily  enoxaparin Injectable 40 milliGRAM(s) SubCutaneous two times a day  losartan 50 milliGRAM(s) Oral daily  thiamine 200 milliGRAM(s) Oral daily      MEDICATIONS  (PRN):  acetaminophen   Tablet .. 650 milliGRAM(s) Oral every 4 hours PRN Temp greater or equal to 38.5C (101.3F)  acetaminophen  Suppository .. 650 milliGRAM(s) Rectal every 4 hours PRN Temp greater or equal to 38.5C (101.3F)  ALBUTerol    90 MICROgram(s) HFA Inhaler 2 Puff(s) Inhalation every 4 hours PRN Shortness of Breath and/or Wheezing  benzonatate 100 milliGRAM(s) Oral three times a day PRN Cough      Allergies    No Known Allergies    Intolerances        PAST MEDICAL & SURGICAL HISTORY:  HLD (hyperlipidemia)  HTN (hypertension)  No significant past surgical history      SOCIAL HISTORY  Smoking History:       REVIEW OF SYSTEMS:    CONSTITUTIONAL:  No distress    HEENT:  Eyes:  No diplopia or blurred vision. ENT:  No earache, sore throat or runny nose.    CARDIOVASCULAR:  No pressure, squeezing, tightness, heaviness or aching about the chest; no palpitations.    RESPIRATORY:  Per HPI    GASTROINTESTINAL:  No nausea, vomiting or diarrhea.    GENITOURINARY:  No dysuria, frequency or urgency.    MUSCULOSKELETAL:  No joint pain    SKIN:  No new lesions.    NEUROLOGIC:  No paresthesias, fasciculations, seizures or weakness.    PSYCHIATRIC:  No disorder of thought or mood.    ENDOCRINE:  No heat or cold intolerance, polyuria or polydipsia.    HEMATOLOGICAL:  No easy bruising or bleeding.     Vital Signs Last 24 Hrs  T(C): 36.8 (13 Apr 2020 22:07), Max: 36.8 (13 Apr 2020 22:07)  T(F): 98.3 (13 Apr 2020 22:07), Max: 98.3 (13 Apr 2020 22:07)  HR: 87 (13 Apr 2020 22:07) (74 - 87)  BP: 131/75 (14 Apr 2020 04:25) (129/71 - 150/94)  BP(mean): --  RR: 22 (14 Apr 2020 04:25) (21 - 22)  SpO2: 93% (14 Apr 2020 04:25) (93% - 94%)    PHYSICAL EXAMINATION:    GENERAL: The patient is awake and alert in no apparent distress.     HEENT: Head is normocephalic and atraumatic. Extraocular muscles are intact. Mucous membranes are moist.    NECK: Supple.    LUNGS: Clear to auscultation without wheezing, rales or rhonchi; respirations unlabored    HEART: Regular rate and rhythm without murmur.    ABDOMEN: Soft, nontender, and nondistended.      EXTREMITIES: Without any cyanosis, clubbing, rash, lesions or edema.    NEUROLOGIC: Grossly intact.    SKIN: No ulceration or induration present.      LABS:                              MICROBIOLOGY:    RADIOLOGY & ADDITIONAL STUDIES:

## 2020-04-14 NOTE — PROGRESS NOTE ADULT - SUBJECTIVE AND OBJECTIVE BOX
[facility]  INFECTIOUS DISEASES AND INTERNAL MEDICINE at [place]  =======================================================  Booker Mancilla MD  Diplomates American Board of Internal Medicine and Infectious Diseases  Tel: [phone]      Fax: [phone]  =======================================================    LARRY SHRESTHA [id number]    Follow up: presumed COVID 19  feeling much better  remains on VM    Allergies:  No Known Allergies      Medications:  acetaminophen   Tablet .. 650 milliGRAM(s) Oral every 4 hours PRN  acetaminophen  Suppository .. 650 milliGRAM(s) Rectal every 4 hours PRN  ALBUTerol    90 MICROgram(s) HFA Inhaler 2 Puff(s) Inhalation every 4 hours PRN  ascorbic acid 1000 milliGRAM(s) Oral daily  benzonatate 100 milliGRAM(s) Oral three times a day PRN  enoxaparin Injectable 40 milliGRAM(s) SubCutaneous two times a day  losartan 50 milliGRAM(s) Oral daily  thiamine 200 milliGRAM(s) Oral daily            REVIEW OF SYSTEMS:  CONSTITUTIONAL:  No Fever or chills  HEENT:   No diplopia or blurred vision.  No earache, sore throat or runny nose.  CARDIOVASCULAR:  No pressure, squeezing, strangling, tightness, heaviness or aching about the chest, neck, axilla or epigastrium.  RESPIRATORY:  No cough, shortness of breath  GASTROINTESTINAL:  No nausea, vomiting or diarrhea.  GENITOURINARY:  No dysuria, frequency or urgency. No Blood in urine  MUSCULOSKELETAL:  no joint aches, no muscle pain  SKIN:  No change in skin, hair or nails.  NEUROLOGIC:  No Headaches, seizures or weakness.  PSYCHIATRIC:  No disorder of thought or mood.  ENDOCRINE:  No heat or cold intolerance  HEMATOLOGICAL:  No easy bruising or bleeding.            Physical Exam:  ICU Vital Signs Last 24 Hrs  T(C): 36.8 (13 Apr 2020 22:07), Max: 36.8 (13 Apr 2020 22:07)  T(F): 98.3 (13 Apr 2020 22:07), Max: 98.3 (13 Apr 2020 22:07)  HR: 87 (13 Apr 2020 22:07) (74 - 87)  BP: 131/75 (14 Apr 2020 04:25) (129/71 - 150/94)  BP(mean): --  ABP: --  ABP(mean): --  RR: 22 (14 Apr 2020 04:25) (21 - 22)  SpO2: 93% (14 Apr 2020 04:25) (93% - 94%)      GEN: NAD, pleasant on VM  HEENT: normocephalic and atraumatic. EOMI. PERRL.  Anicteric   NECK: Supple.   LUNGS: Clear to auscultation.  HEART: Regular rate and rhythm without murmur.  ABDOMEN: Soft, nontender, and nondistended.  Positive bowel sounds.    : No CVA tenderness  EXTREMITIES: Without any edema.  MSK: no joint swelling  NEUROLOGIC: Cranial nerves II through XII are grossly intact. No focal deficits  PSYCHIATRIC: Appropriate affect .  SKIN: No Rash      Labs:

## 2020-04-14 NOTE — PROGRESS NOTE ADULT - SUBJECTIVE AND OBJECTIVE BOX
LARRY HENRIETTA    [id number]    60y      Male    Patient is a 60y old  Male who presents with a chief complaint of SOB/cough (13 Apr 2020 13:20)      INTERVAL HPI/OVERNIGHT EVENTS:    Patient is feeling improvement of SOB, his cough is also improving, he has no fever, chills, he has been weaned down from the Nonrebreather to Venti mask, has been saturating well on Venti mask, will wean down on nasal cannula     REVIEW OF SYSTEMS:    CONSTITUTIONAL: No fever, some fatigue  RESPIRATORY: Improving cough and shortness of breath  CARDIOVASCULAR: No chest pain, palpitations  GASTROINTESTINAL: No abdominal, No nausea, vomiting  NEUROLOGICAL: No headaches,  loss of strength  MISCELLANEOUS: No joint swelling or pain.     Vital Signs Last 24 Hrs  T(C): 36.8 (13 Apr 2020 22:07), Max: 36.8 (13 Apr 2020 22:07)  T(F): 98.3 (13 Apr 2020 22:07), Max: 98.3 (13 Apr 2020 22:07)  HR: 87 (13 Apr 2020 22:07) (74 - 87)  BP: 131/75 (14 Apr 2020 04:25) (129/71 - 150/94)  RR: 22 (14 Apr 2020 04:25) (21 - 22)  SpO2: 93% (14 Apr 2020 04:25) (93% - 94%)    PHYSICAL EXAM:    GENERAL: Middle age  male looking comfortable   HEENT: PERRL, +EOMI  NECK: soft, Supple, No JVD   CHEST/LUNG: Decrease air entry bilaterally; No wheezing  HEART: S1S2+, Regular rate and rhythm; No murmurs  ABDOMEN: Soft, Nontender, Nondistended; Bowel sounds present  EXTREMITIES:  1+ Peripheral Pulses, No edema  SKIN: No rashes or lesions  NEURO: AAOX3, no focal deficits, no motor r sensory loss  PSYCH: normal mood        MEDICATIONS  (STANDING):  ascorbic acid 1000 milliGRAM(s) Oral daily  enoxaparin Injectable 40 milliGRAM(s) SubCutaneous two times a day  losartan 50 milliGRAM(s) Oral daily  thiamine 200 milliGRAM(s) Oral daily    MEDICATIONS  (PRN):  acetaminophen   Tablet .. 650 milliGRAM(s) Oral every 4 hours PRN Temp greater or equal to 38.5C (101.3F)  acetaminophen  Suppository .. 650 milliGRAM(s) Rectal every 4 hours PRN Temp greater or equal to 38.5C (101.3F)  ALBUTerol    90 MICROgram(s) HFA Inhaler 2 Puff(s) Inhalation every 4 hours PRN Shortness of Breath and/or Wheezing  benzonatate 100 milliGRAM(s) Oral three times a day PRN Cough

## 2020-04-15 LAB
CK SERPL-CCNC: 96 U/L — SIGNIFICANT CHANGE UP (ref 30–200)
FERRITIN SERPL-MCNC: 1392 NG/ML — HIGH (ref 30–400)
HAV IGM SER-ACNC: ABNORMAL
HBV CORE IGM SER-ACNC: SIGNIFICANT CHANGE UP
HBV SURFACE AG SER-ACNC: SIGNIFICANT CHANGE UP
HCV AB S/CO SERPL IA: 0.16 S/CO — SIGNIFICANT CHANGE UP (ref 0–0.99)
HCV AB SERPL-IMP: SIGNIFICANT CHANGE UP
LDH SERPL L TO P-CCNC: 266 U/L — HIGH (ref 98–192)

## 2020-04-15 PROCEDURE — 99232 SBSQ HOSP IP/OBS MODERATE 35: CPT

## 2020-04-15 RX ADMIN — Medication 45 MILLIGRAM(S): at 05:56

## 2020-04-15 RX ADMIN — ENOXAPARIN SODIUM 40 MILLIGRAM(S): 100 INJECTION SUBCUTANEOUS at 21:18

## 2020-04-15 RX ADMIN — ENOXAPARIN SODIUM 40 MILLIGRAM(S): 100 INJECTION SUBCUTANEOUS at 09:00

## 2020-04-15 RX ADMIN — Medication 100 MILLIGRAM(S): at 09:01

## 2020-04-15 RX ADMIN — Medication 200 MILLIGRAM(S): at 09:01

## 2020-04-15 RX ADMIN — Medication 650 MILLIGRAM(S): at 09:01

## 2020-04-15 RX ADMIN — Medication 1000 MILLIGRAM(S): at 09:00

## 2020-04-15 RX ADMIN — LOSARTAN POTASSIUM 50 MILLIGRAM(S): 100 TABLET, FILM COATED ORAL at 05:42

## 2020-04-15 NOTE — PROGRESS NOTE ADULT - SUBJECTIVE AND OBJECTIVE BOX
LARRY HENRIETTA    [id number]    60y      Male    Patient is a 60y old  Male who presents with a chief complaint of SOB/cough (14 Apr 2020 14:49)      INTERVAL HPI/OVERNIGHT EVENTS:    Patient is feeling improvement of SOB, he has no more cough, he has been weaned to Venti mask, now being weaned down to Nasal cannula, he has no fever, chills.      REVIEW OF SYSTEMS:    CONSTITUTIONAL: No fever, some fatigue  RESPIRATORY: no cough and shortness of breath  CARDIOVASCULAR: No chest pain, palpitations  GASTROINTESTINAL: No abdominal, No nausea, vomiting  NEUROLOGICAL: No headaches,  loss of strength  MISCELLANEOUS: No joint swelling or pain.       Vital Signs Last 24 Hrs  T(C): 36.8 (15 Apr 2020 07:51), Max: 36.8 (14 Apr 2020 16:00)  T(F): 98.3 (15 Apr 2020 07:51), Max: 98.3 (15 Apr 2020 07:51)  HR: 60 (15 Apr 2020 07:51) (60 - 66)  BP: 126/70 (15 Apr 2020 07:51) (126/70 - 138/77)  RR: 22 (15 Apr 2020 07:51) (22 - 24)  SpO2: 94% (15 Apr 2020 07:51) (94% - 96%)    PHYSICAL EXAM:  GENERAL: Middle age  male looking comfortable   HEENT: PERRL, +EOMI  NECK: soft, Supple, No JVD   CHEST/LUNG: Decrease air entry bilaterally; No wheezing  HEART: S1S2+, Regular rate and rhythm; No murmurs  ABDOMEN: Soft, Nontender, Nondistended; Bowel sounds present  EXTREMITIES:  1+ Peripheral Pulses, No edema  SKIN: No rashes or lesions  NEURO: AAOX3, no focal deficits, no motor r sensory loss  PSYCH: normal mood    MEDICATIONS  (STANDING):  ascorbic acid 1000 milliGRAM(s) Oral daily  enoxaparin Injectable 40 milliGRAM(s) SubCutaneous two times a day  losartan 50 milliGRAM(s) Oral daily  thiamine 200 milliGRAM(s) Oral daily    MEDICATIONS  (PRN):  acetaminophen   Tablet .. 650 milliGRAM(s) Oral every 4 hours PRN Temp greater or equal to 38.5C (101.3F)  acetaminophen  Suppository .. 650 milliGRAM(s) Rectal every 4 hours PRN Temp greater or equal to 38.5C (101.3F)  ALBUTerol    90 MICROgram(s) HFA Inhaler 2 Puff(s) Inhalation every 4 hours PRN Shortness of Breath and/or Wheezing  benzonatate 100 milliGRAM(s) Oral three times a day PRN Cough

## 2020-04-16 PROCEDURE — 99232 SBSQ HOSP IP/OBS MODERATE 35: CPT

## 2020-04-16 RX ADMIN — ENOXAPARIN SODIUM 40 MILLIGRAM(S): 100 INJECTION SUBCUTANEOUS at 22:40

## 2020-04-16 RX ADMIN — LOSARTAN POTASSIUM 50 MILLIGRAM(S): 100 TABLET, FILM COATED ORAL at 04:30

## 2020-04-16 RX ADMIN — Medication 1000 MILLIGRAM(S): at 10:30

## 2020-04-16 RX ADMIN — Medication 200 MILLIGRAM(S): at 10:30

## 2020-04-16 RX ADMIN — ENOXAPARIN SODIUM 40 MILLIGRAM(S): 100 INJECTION SUBCUTANEOUS at 10:29

## 2020-04-16 NOTE — PROGRESS NOTE ADULT - NSHPATTENDINGPLANDISCUSS_GEN_ALL_CORE
Patient and RN

## 2020-04-16 NOTE — PROGRESS NOTE ADULT - REASON FOR ADMISSION
SOB/cough

## 2020-04-16 NOTE — PROGRESS NOTE ADULT - ASSESSMENT
59 yo Male with PMH significant for HTN, HLD presents today c/o cough, subjective fever, worsening SOB for the 3 days, being admitted for Acute Respiratory Failure with Hypoxia likely secondary to viral Pneumonia, COVID-19 result pending.In the ED, saturation on RA 88%; placed on O2 via NC 5-6; inflammatory makers elevated. CXR: multifocal pneumonia      acute hypoxic resp failure, suspected covid    initial covid negative    repeat as high clinical suspicion, check RVP    c/w plaquenil, o2      Hyponatremia   Likely due to dehydration 2/2 poor PO intake     HTN  Add Losartan 50 mg QD (at home on Irbersartan 150mg)      HLD  Hold home regimen due to elevated LFT'; Atorvastatin 10mg QD
59 yo Male with PMH significant for HTN, HLD presents today c/o cough, subjective fever, worsening SOB for the 3 days, being admitted for Acute Respiratory Failure with Hypoxia likely secondary to viral Pneumonia, COVID-19 result pending.In the ED, saturation on RA 88%; placed on O2 via NC 5-6; inflammatory makers elevated. CXR: multifocal pneumonia      acute hypoxic resp failure: clinically fits covid    COVID negative x3, rvp negative  trend inflammatory markers  ID consult appreciatedcan complete HCQ  - c/w Ceftriaxone x 5 days + azithromycin x 3 days  Pulmonary consult appreciated, will titrate supplemental oxygen, self proning, IS encouraged    Hyponatremia  Likely due to dehydration 2/2 poor PO intake , repeat labs in am.     HTN  Cont Losartan 50 mg QD (at home on Irbesartan 150mg), will monitor.       HLD  Hold home regimen due to elevated LFT'; Atorvastatin 10mg QD.     DVT prophylaxis: Lovenox sc   Seen and discussed with Dr Churchill
59 yo Male with PMH significant for HTN, HLD presents today c/o cough, subjective fever, worsening SOB for the 3 days, being admitted for Acute Respiratory Failure with Hypoxia likely secondary to viral Pneumonia, COVID-19 result pending.In the ED, saturation on RA 88%; placed on O2 via NC 5-6; inflammatory makers elevated. CXR: multifocal pneumonia      acute hypoxic resp failure: clinically fits covid,  covid negative x3, rvp negative, trend inflammatory markers, CTA chest done showed No pulmonary embolus is noted within the main, right and left main and lobar pulmonary artery branches bilaterally. Evaluationof the segmental and subsegmental pulmonary artery branches bilaterally is limited due to poor opacification, Extensive ground glass opacities are noted throughout both lungs. The finding is consistent with the patient's known history of viral pneumonia, ID consult appreciated, c/w Plaquenil ceftriaxone and azithromycin, albuterol inhaler as needed, pulmonary consult appreciated, will titrate supplemental oxygen, self proning, IS encouraged    Hyponatremia  Likely due to dehydration 2/2 poor PO intake , repeat labs in am.     HTN  Add Losartan 50 mg QD (at home on Irbesartan 150mg), will monitor.       HLD  Hold home regimen due to elevated LFT'; Atorvastatin 10mg QD.     DVT prophylaxis: Lovenox sc
59 yo Male with PMH significant for HTN, HLD presents today c/o cough, subjective fever, worsening SOB for the 3 days, being admitted for Acute Respiratory Failure with Hypoxia likely secondary to viral Pneumonia, COVID-19 result pending.In the ED, saturation on RA 88%; placed on O2 via NC 5-6; inflammatory makers elevated. CXR: multifocal pneumonia      acute hypoxic resp failure: clinically fits covid, has been weaned down to Venti mask    COVID negative x3, rvp negative, trend inflammatory markers  ID consult appreciatedcan complete HCQ, c/w Ceftriaxone x 5 days + azithromycin x 3 days  Pulmonary consult appreciated, will titrate supplemental oxygen, self proning, IS encouraged    Hyponatremia  Likely due to dehydration 2/2 poor PO intake ,slowly improving    HTN  Cont Losartan 50 mg QD (at home on Irbesartan 150mg), will monitor.       HLD  Hold home regimen due to elevated LFT'; Atorvastatin 10mg QD.     DVT prophylaxis: Lovenox sc
59 yo Male with PMH significant for HTN, HLD presents today c/o cough, subjective fever, worsening SOB for the 3 days, being admitted for Acute Respiratory Failure with Hypoxia likely secondary to viral Pneumonia, COVID-19 result pending.In the ED, saturation on RA 88%; placed on O2 via NC 5-6; inflammatory makers elevated. CXR: multifocal pneumonia      acute hypoxic resp failure: clinically fits covid, has been weaned down to Venti mask    COVID negative x3, rvp negative, trend inflammatory markers, ID consult appreciated complete HCQ, c/w Ceftriaxone and azithromycin completed, Pulmonary consult appreciated, titrated supplemental oxygen from non rebreather to venti mask, will continue to titrate more, self proning, IS encouraged, wean     Hyponatremia  Likely due to dehydration 2/2 poor PO intake ,slowly improving    HTN  Cont Losartan 50 mg QD (at home on Irbesartan 150mg), will monitor.       HLD  Hold home regimen due to elevated LFT'; Atorvastatin 10mg QD.     DVT prophylaxis: Lovenox sc
59 yo Male with PMH significant for HTN, HLD presents today c/o cough, subjective fever, worsening SOB for the 3 days, being admitted for Acute Respiratory Failure with Hypoxia likely secondary to viral Pneumonia, COVID-19 result pending.In the ED, saturation on RA 88%; placed on O2 via NC 5-6; inflammatory makers elevated. CXR: multifocal pneumonia      acute hypoxic resp failure: clinically fits covid, has been weaned down to Venti mask, now being weaned down to Nasal cannula    COVID negative x3, rvp negative, trend inflammatory markers, ID consult appreciated complete HCQ, c/w Ceftriaxone and azithromycin completed, Pulmonary consult appreciated, titrated supplemental oxygen from non rebreather to venti mask  now titrating down to to Nasal cannula, will continue to titrate more, self proning, IS encouraged, wean     Hyponatremia  Likely due to dehydration 2/2 poor PO intake ,slowly improving    HTN  Cont Losartan 50 mg QD (at home on Irbesartan 150mg), will monitor.       HLD  Hold home regimen due to elevated LFT'; Atorvastatin 10mg QD.     DVT prophylaxis: Lovenox sc
59 yo Male with PMH significant for HTN, HLD presents today c/o cough, subjective fever, worsening SOB for the 3 days, being admitted for Acute Respiratory Failure with Hypoxia likely secondary to viral Pneumonia, COVID-19 result pending.In the ED, saturation on RA 88%; placed on O2 via NC 5-6; inflammatory makers elevated. CXR: multifocal pneumonia      acute hypoxic resp failure: clinically fits covid.  covid negative x3, rvp negative    trend inflammatory markers, CTA chest done showed No pulmonary embolus is noted within the main, right and left main and lobar pulmonary artery branches bilaterally. Evaluationof the segmental and subsegmental pulmonary artery branches bilaterally is limited due to poor opacification, Extensive ground glass opacities are noted throughout both lungs. The finding is consistent with the patient's known history of viral pneumonia, ID consult appreciated, c/w Plaquenil ceftriaxone and azithromycin, albuterol inhaler as needed     Hyponatremia   Likely due to dehydration 2/2 poor PO intake , repeat labs in am    HTN  Add Losartan 50 mg QD (at home on Irbersartan 150mg), will monitor.       HLD  Hold home regimen due to elevated LFT'; Atorvastatin 10mg QD.     DVT prophylaxis: Lovenox sc
60 y.o. Male with PMHx significant for HTN, HDL presents c/o shortness of breath for three days. Patient reports his symptoms started 5-6 days ago, and initially presented with back pain/muscle aches, headaches, dry cough and subjective fever, poor apatite. Patient reports, he called his PMD who advised him to take Tylenol for his symptoms and self quarantine.   At the ED noted to be hypoxic sating 88% on RA, was placed on 5 Lts of O2 via NC, mild fever 100.4, CXR: bilateral interstitial lung opacities     HTN  Hypoxia  Transaminitis  presumed COVID 19    - feeling better  - WBC elevated due to steroids  - COVID 19 x 3 negative- high suspicion for COVID 19 despite negative testing  - RVP negative  - Legionella urine negative  - CT chest extensive GGO both lungs  - completed Ceftriaxone x 5 days + azithromycin x 3 days, HCQ x 5 days  - If fever recurs check BCX x 2  - appreciate pulm eval  - Short course steroids  - Rheum w/u, send quant hepatitis panel cytokine panel  - Taper O2  - Trend Fever  - Trend Leukocytosis  - inpatient contact/airborne isolation    d/w RN
60 y.o. Male with PMHx significant for HTN, HDL presents c/o shortness of breath for three days. Patient reports his symptoms started 5-6 days ago, and initially presented with back pain/muscle aches, headaches, dry cough and subjective fever, poor apatite. Patient reports, he called his PMD who advised him to take Tylenol for his symptoms and self quarantine.   At the ED noted to be hypoxic sating 88% on RA, was placed on 5 Lts of O2 via NC, mild fever 100.4, CXR: bilateral interstitial lung opacities     HTN  Hypoxia  Transaminitis  presumed COVID 19    - feeling better  - WBC elevated due to steroids  - COVID 19 x 3 negative- high suspicion for COVID 19 despite negative testing  - RVP negative  - Legionella urine negative  - CT chest extensive GGO both lungs  - completed Ceftriaxone x 5 days + azithromycin x 3 days, HCQ x 5 days  - If fever recurs check BCX x 2  - appreciate pulm eval  - complete methylprednisolone 1 mg/kg IV q12h x 4 days then 0.5 mg/kg IV q12h x 1 day  - Rheum w/u, send quant hepatitis panel cytokine panel  - Taper O2, proning as tolerated  - Trend Fever  - Trend Leukocytosis  - inpatient contact/airborne isolation    Signing off.
60 y.o. Male with PMHx significant for HTN, HDL presents c/o shortness of breath for three days. Patient reports his symptoms started 5-6 days ago, and initially presented with back pain/muscle aches, headaches, dry cough and subjective fever, poor apatite. Patient reports, he called his PMD who advised him to take Tylenol for his symptoms and self quarantine.   At the ED noted to be hypoxic sating 88% on RA, was placed on 5 Lts of O2 via NC, mild fever 100.4, CXR: bilateral interstitial lung opacities     HTN  Hypoxia  Transaminitis  r/o CAP      - COVID 19 x 3 negative  - RVP negative  - Legionella urine negative  - CT chest extensive GGO both lungs  - can complete HCQ  - c/w Ceftriaxone x 5 days + azithromycin x 3 days  - If fever recurs check BCX x 2  - appreciate pulm eval  - Short course steroids  - Rheum w/u, send quant hepatitis panel cytokine panel  - Taper O2  - Trend Fever  - Trend Leukocytosis      d/w NP
60 y.o. Male with PMHx significant for HTN, HDL presents c/o shortness of breath for three days. Patient reports his symptoms started 5-6 days ago, and initially presented with back pain/muscle aches, headaches, dry cough and subjective fever, poor apatite. Patient reports, he called his PMD who advised him to take Tylenol for his symptoms and self quarantine.   At the ED noted to be hypoxic sating 88% on RA, was placed on 5 Lts of O2 via NC, mild fever 100.4, CXR: bilateral interstitial lung opacities     HTN  Hypoxia  Transaminitis  r/o CAP      - COVID 19 x 3 negative  - RVP negative  - Legionella urine pending  - CT chest extensive GGO both lungs  - DC HCQ  - c/w Ceftriaxone + azithromycin  - If fever recurs check BCX x 2  - Suggest pulm eval  - Trend Fever  - Trend Leukocytosis    Will follow
60 y.o. Male with PMHx significant for HTN, HDL presents c/o shortness of breath for three days. Patient reports his symptoms started 5-6 days ago, and initially presented with back pain/muscle aches, headaches, dry cough and subjective fever, poor apatite. Patient reports, he called his PMD who advised him to take Tylenol for his symptoms and self quarantine.   At the ED noted to be hypoxic sating 88% on RA, was placed on 5 Lts of O2 via NC, mild fever 100.4, CXR: bilateral interstitial lung opacities     HTN  Hypoxia  Transaminitis  r/o CAP      - COVID 19 x 3 negative  - RVP negative  - Legionella urine pending  - CT chest extensive GGO both lungs  - can complete HCQ  - c/w Ceftriaxone + azithromycin  - If fever recurs check BCX x 2  - appreciate pulm eval  - Short course steroids  - Rheum w/u  - Taper O2  - Trend Fever  - Trend Leukocytosis    d/w Dr Churchill  Will follow
61 yo Male with PMH significant for HTN, HLD presents today c/o cough, subjective fever, worsening SOB for the 3 days, being admitted for Acute Respiratory Failure with Hypoxia likely secondary to viral Pneumonia, COVID-19 result pending.In the ED, saturation on RA 88%; placed on O2 via NC 5-6; inflammatory makers elevated. CXR: multifocal pneumonia      acute hypoxic resp failure:     clinically fits covid.  covid negative x2, rvp negative    trend inflammatory markers    check CTA chest     ID consulted     c/w plaquenil, o2      Hyponatremia   Likely due to dehydration 2/2 poor PO intake     HTN  Add Losartan 50 mg QD (at home on Irbersartan 150mg)      HLD  Hold home regimen due to elevated LFT'; Atorvastatin 10mg QD
61 yo Male with PMH significant for HTN, HLD presents today c/o cough, subjective fever, worsening SOB for the 3 days, being admitted for Acute Respiratory Failure with Hypoxia likely secondary to viral Pneumonia, COVID-19 result pending.In the ED, saturation on RA 88%; placed on O2 via NC 5-6; inflammatory makers elevated. CXR: multifocal pneumonia      acute hypoxic resp failure: clinically fits covid, has been weaned down to Venti mask    COVID negative x3, rvp negative, trend inflammatory markers, ID consult appreciatedcan complete HCQ, c/w Ceftriaxone x 5 days + azithromycin x 3 days  Pulmonary consult appreciated, titrated supplemental oxygen from non rebreather to venti mask, will continue to titrate more, self proning, IS encouraged, wean     Hyponatremia  Likely due to dehydration 2/2 poor PO intake ,slowly improving    HTN  Cont Losartan 50 mg QD (at home on Irbesartan 150mg), will monitor.       HLD  Hold home regimen due to elevated LFT'; Atorvastatin 10mg QD.     DVT prophylaxis: Lovenox sc
Improving clinically but remains with oxygen requirement.  However O2 sat observed on current FIO2 was 100%.  Will not be able to be discharged unless O2 requirements are at least at the level of nasal O2    Plan:  1.Discussed with staff>advised to attempt nasal O2 and titrate to sat 92-93%  2.Avoid hyperoxia (sats should be below 96%)  3.May need to consider additional steroids   4.Will need outpatient f/u CT scan about 4-6 weeks post discharge.
59 yo Male with PMH significant for HTN, HLD presents today c/o cough, subjective fever, worsening SOB for the 3 days, being admitted for Acute Respiratory Failure with Hypoxia likely secondary to viral Pneumonia, COVID-19 result pending.In the ED, saturation on RA 88%; placed on O2 via NC 5-6; inflammatory makers elevated. CXR: multifocal pneumonia      acute hypoxic resp failure: clinically fits covid, has been weaned down to Venti mask, now being weaned down to Nasal cannula    COVID negative x3, rvp negative, trend inflammatory markers, ID consult appreciated complete HCQ, c/w Ceftriaxone and azithromycin completed, Pulmonary consult appreciated, titrated supplemental oxygen from non rebreather to venti mask  now titrating down to to Nasal cannula, will continue to titrate more, self proning, IS encouraged, will repeat inflammatory mediators       Hyponatremia  Likely due to dehydration 2/2 poor PO intake ,slowly improving, will repeat BMP     HTN  Cont Losartan 50 mg QD (at home on Irbesartan 150mg), will monitor.       HLD  Hold home regimen due to elevated LFT'; Atorvastatin 10mg QD.     DVT prophylaxis: Lovenox sc

## 2020-04-16 NOTE — PROGRESS NOTE ADULT - SUBJECTIVE AND OBJECTIVE BOX
LARRY SHRESTHA    [id number]    60y      Male    Patient is a 60y old  Male who presents with a chief complaint of SOB/cough (15 Apr 2020 11:41)      INTERVAL HPI/OVERNIGHT EVENTS:    Patient has no more SOB, he has been weaned to Venti mask, now being weaned down to Nasal cannula, he has no fever, chills.     REVIEW OF SYSTEMS:    CONSTITUTIONAL: No fever, some fatigue  RESPIRATORY: no cough and shortness of breath  CARDIOVASCULAR: No chest pain, palpitations  GASTROINTESTINAL: No abdominal, No nausea, vomiting  NEUROLOGICAL: No headaches,  loss of strength  MISCELLANEOUS: No joint swelling or pain.        Vital Signs Last 24 Hrs  T(C): 36.7 (16 Apr 2020 08:35), Max: 36.8 (15 Apr 2020 16:15)  T(F): 98.1 (16 Apr 2020 08:35), Max: 98.3 (15 Apr 2020 20:07)  HR: 74 (16 Apr 2020 08:35) (55 - 88)  BP: 98/65 (16 Apr 2020 08:35) (98/65 - 142/84)  RR: 18 (16 Apr 2020 08:35) (18 - 20)  SpO2: 91% (16 Apr 2020 08:43) (91% - 95%)    PHYSICAL EXAM:    GENERAL: Middle age  male looking comfortable   HEENT: PERRL, +EOMI  NECK: soft, Supple, No JVD   CHEST/LUNG: Decrease air entry bilaterally; No wheezing  HEART: S1S2+, Regular rate and rhythm; No murmurs  ABDOMEN: Soft, Nontender, Nondistended; Bowel sounds present  EXTREMITIES:  1+ Peripheral Pulses, No edema  SKIN: No rashes or lesions  NEURO: AAOX3, no focal deficits, no motor r sensory loss  PSYCH: normal mood      16 Apr 2020 07:01  -  16 Apr 2020 12:41  --------------------------------------------------------  IN: 0 mL / OUT: 300 mL / NET: -300 mL        MEDICATIONS  (STANDING):  ascorbic acid 1000 milliGRAM(s) Oral daily  enoxaparin Injectable 40 milliGRAM(s) SubCutaneous two times a day  losartan 50 milliGRAM(s) Oral daily  thiamine 200 milliGRAM(s) Oral daily    MEDICATIONS  (PRN):  acetaminophen   Tablet .. 650 milliGRAM(s) Oral every 4 hours PRN Temp greater or equal to 38.5C (101.3F)  acetaminophen  Suppository .. 650 milliGRAM(s) Rectal every 4 hours PRN Temp greater or equal to 38.5C (101.3F)  ALBUTerol    90 MICROgram(s) HFA Inhaler 2 Puff(s) Inhalation every 4 hours PRN Shortness of Breath and/or Wheezing  benzonatate 100 milliGRAM(s) Oral three times a day PRN Cough LARRY SHRESTHA    [id number]    60y      Male    Patient is a 60y old  Male who presents with a chief complaint of SOB/cough (15 Apr 2020 11:41)      INTERVAL HPI/OVERNIGHT EVENTS:    Patient is feeling improvement of SOB, he has no more cough, he has been weaned to Venti mask, now being weaned down to Nasal cannula, he has no fever, chills, he has been going back and forth from Venti mask to nasal cannula.    REVIEW OF SYSTEMS:    CONSTITUTIONAL: No fever, some fatigue  RESPIRATORY: no cough and shortness of breath  CARDIOVASCULAR: No chest pain, palpitations  GASTROINTESTINAL: No abdominal, No nausea, vomiting  NEUROLOGICAL: No headaches,  loss of strength  MISCELLANEOUS: No joint swelling or pain.       Vital Signs Last 24 Hrs  T(C): 36.7 (16 Apr 2020 08:35), Max: 36.8 (15 Apr 2020 16:15)  T(F): 98.1 (16 Apr 2020 08:35), Max: 98.3 (15 Apr 2020 20:07)  HR: 74 (16 Apr 2020 08:35) (55 - 88)  BP: 98/65 (16 Apr 2020 08:35) (98/65 - 142/84)  RR: 18 (16 Apr 2020 08:35) (18 - 20)  SpO2: 91% (16 Apr 2020 08:43) (91% - 95%)    PHYSICAL EXAM:    GENERAL: Middle age  male looking comfortable   HEENT: PERRL, +EOMI  NECK: soft, Supple, No JVD   CHEST/LUNG: Decrease air entry bilaterally; No wheezing  HEART: S1S2+, Regular rate and rhythm; No murmurs  ABDOMEN: Soft, Nontender, Nondistended; Bowel sounds present  EXTREMITIES:  1+ Peripheral Pulses, No edema  SKIN: No rashes or lesions  NEURO: AAOX3, no focal deficits, no motor r sensory loss  PSYCH: normal mood      16 Apr 2020 07:01  -  16 Apr 2020 12:41  --------------------------------------------------------  IN: 0 mL / OUT: 300 mL / NET: -300 mL        MEDICATIONS  (STANDING):  ascorbic acid 1000 milliGRAM(s) Oral daily  enoxaparin Injectable 40 milliGRAM(s) SubCutaneous two times a day  losartan 50 milliGRAM(s) Oral daily  thiamine 200 milliGRAM(s) Oral daily    MEDICATIONS  (PRN):  acetaminophen   Tablet .. 650 milliGRAM(s) Oral every 4 hours PRN Temp greater or equal to 38.5C (101.3F)  acetaminophen  Suppository .. 650 milliGRAM(s) Rectal every 4 hours PRN Temp greater or equal to 38.5C (101.3F)  ALBUTerol    90 MICROgram(s) HFA Inhaler 2 Puff(s) Inhalation every 4 hours PRN Shortness of Breath and/or Wheezing  benzonatate 100 milliGRAM(s) Oral three times a day PRN Cough

## 2020-04-17 ENCOUNTER — TRANSCRIPTION ENCOUNTER (OUTPATIENT)
Age: 60
End: 2020-04-17

## 2020-04-17 VITALS — OXYGEN SATURATION: 93 %

## 2020-04-17 LAB
ALBUMIN SERPL ELPH-MCNC: 3.2 G/DL — LOW (ref 3.3–5.2)
ALP SERPL-CCNC: 75 U/L — SIGNIFICANT CHANGE UP (ref 40–120)
ALT FLD-CCNC: 113 U/L — HIGH
ANION GAP SERPL CALC-SCNC: 11 MMOL/L — SIGNIFICANT CHANGE UP (ref 5–17)
AST SERPL-CCNC: 33 U/L — SIGNIFICANT CHANGE UP
BILIRUB SERPL-MCNC: 0.9 MG/DL — SIGNIFICANT CHANGE UP (ref 0.4–2)
BUN SERPL-MCNC: 21 MG/DL — HIGH (ref 8–20)
CALCIUM SERPL-MCNC: 8.6 MG/DL — SIGNIFICANT CHANGE UP (ref 8.6–10.2)
CHLORIDE SERPL-SCNC: 97 MMOL/L — LOW (ref 98–107)
CK SERPL-CCNC: 37 U/L — SIGNIFICANT CHANGE UP (ref 30–200)
CO2 SERPL-SCNC: 24 MMOL/L — SIGNIFICANT CHANGE UP (ref 22–29)
CREAT SERPL-MCNC: 0.71 MG/DL — SIGNIFICANT CHANGE UP (ref 0.5–1.3)
CRP SERPL-MCNC: 0.66 MG/DL — HIGH (ref 0–0.4)
D DIMER BLD IA.RAPID-MCNC: 3150 NG/ML DDU — HIGH
FERRITIN SERPL-MCNC: 1288 NG/ML — HIGH (ref 30–400)
GLUCOSE SERPL-MCNC: 105 MG/DL — HIGH (ref 70–99)
HCT VFR BLD CALC: 48.4 % — SIGNIFICANT CHANGE UP (ref 39–50)
HGB BLD-MCNC: 15.8 G/DL — SIGNIFICANT CHANGE UP (ref 13–17)
MCHC RBC-ENTMCNC: 29.4 PG — SIGNIFICANT CHANGE UP (ref 27–34)
MCHC RBC-ENTMCNC: 32.6 GM/DL — SIGNIFICANT CHANGE UP (ref 32–36)
MCV RBC AUTO: 90.1 FL — SIGNIFICANT CHANGE UP (ref 80–100)
PLATELET # BLD AUTO: 580 K/UL — HIGH (ref 150–400)
POTASSIUM SERPL-MCNC: 4.2 MMOL/L — SIGNIFICANT CHANGE UP (ref 3.5–5.3)
POTASSIUM SERPL-SCNC: 4.2 MMOL/L — SIGNIFICANT CHANGE UP (ref 3.5–5.3)
PROCALCITONIN SERPL-MCNC: 0.07 NG/ML — SIGNIFICANT CHANGE UP (ref 0.02–0.1)
PROT SERPL-MCNC: 6.7 G/DL — SIGNIFICANT CHANGE UP (ref 6.6–8.7)
RBC # BLD: 5.37 M/UL — SIGNIFICANT CHANGE UP (ref 4.2–5.8)
RBC # FLD: 13.2 % — SIGNIFICANT CHANGE UP (ref 10.3–14.5)
SODIUM SERPL-SCNC: 132 MMOL/L — LOW (ref 135–145)
WBC # BLD: 6.79 K/UL — SIGNIFICANT CHANGE UP (ref 3.8–10.5)
WBC # FLD AUTO: 6.79 K/UL — SIGNIFICANT CHANGE UP (ref 3.8–10.5)

## 2020-04-17 PROCEDURE — 86140 C-REACTIVE PROTEIN: CPT

## 2020-04-17 PROCEDURE — 84100 ASSAY OF PHOSPHORUS: CPT

## 2020-04-17 PROCEDURE — 85379 FIBRIN DEGRADATION QUANT: CPT

## 2020-04-17 PROCEDURE — 85027 COMPLETE CBC AUTOMATED: CPT

## 2020-04-17 PROCEDURE — 99239 HOSP IP/OBS DSCHRG MGMT >30: CPT

## 2020-04-17 PROCEDURE — 93005 ELECTROCARDIOGRAM TRACING: CPT

## 2020-04-17 PROCEDURE — 87449 NOS EACH ORGANISM AG IA: CPT

## 2020-04-17 PROCEDURE — 87633 RESP VIRUS 12-25 TARGETS: CPT

## 2020-04-17 PROCEDURE — 36415 COLL VENOUS BLD VENIPUNCTURE: CPT

## 2020-04-17 PROCEDURE — 71260 CT THORAX DX C+: CPT

## 2020-04-17 PROCEDURE — 80053 COMPREHEN METABOLIC PANEL: CPT

## 2020-04-17 PROCEDURE — 87581 M.PNEUMON DNA AMP PROBE: CPT

## 2020-04-17 PROCEDURE — 82550 ASSAY OF CK (CPK): CPT

## 2020-04-17 PROCEDURE — 82728 ASSAY OF FERRITIN: CPT

## 2020-04-17 PROCEDURE — 87486 CHLMYD PNEUM DNA AMP PROBE: CPT

## 2020-04-17 PROCEDURE — 84484 ASSAY OF TROPONIN QUANT: CPT

## 2020-04-17 PROCEDURE — 87635 SARS-COV-2 COVID-19 AMP PRB: CPT

## 2020-04-17 PROCEDURE — 85652 RBC SED RATE AUTOMATED: CPT

## 2020-04-17 PROCEDURE — 94640 AIRWAY INHALATION TREATMENT: CPT

## 2020-04-17 PROCEDURE — 83735 ASSAY OF MAGNESIUM: CPT

## 2020-04-17 PROCEDURE — 80074 ACUTE HEPATITIS PANEL: CPT

## 2020-04-17 PROCEDURE — T1013: CPT

## 2020-04-17 PROCEDURE — 84145 PROCALCITONIN (PCT): CPT

## 2020-04-17 PROCEDURE — 87798 DETECT AGENT NOS DNA AMP: CPT

## 2020-04-17 PROCEDURE — 99285 EMERGENCY DEPT VISIT HI MDM: CPT

## 2020-04-17 PROCEDURE — 83615 LACTATE (LD) (LDH) ENZYME: CPT

## 2020-04-17 PROCEDURE — 80048 BASIC METABOLIC PNL TOTAL CA: CPT

## 2020-04-17 PROCEDURE — 71045 X-RAY EXAM CHEST 1 VIEW: CPT

## 2020-04-17 PROCEDURE — 83520 IMMUNOASSAY QUANT NOS NONAB: CPT

## 2020-04-17 PROCEDURE — 86803 HEPATITIS C AB TEST: CPT

## 2020-04-17 RX ORDER — THIAMINE MONONITRATE (VIT B1) 100 MG
2 TABLET ORAL
Qty: 0 | Refills: 0 | DISCHARGE
Start: 2020-04-17

## 2020-04-17 RX ORDER — ATORVASTATIN CALCIUM 80 MG/1
1 TABLET, FILM COATED ORAL
Qty: 0 | Refills: 0 | DISCHARGE

## 2020-04-17 RX ORDER — RIVAROXABAN 15 MG-20MG
1 KIT ORAL
Qty: 30 | Refills: 0
Start: 2020-04-17 | End: 2020-05-16

## 2020-04-17 RX ORDER — ALBUTEROL 90 UG/1
2 AEROSOL, METERED ORAL
Qty: 1 | Refills: 0
Start: 2020-04-17 | End: 2020-05-01

## 2020-04-17 RX ORDER — ACETAMINOPHEN 500 MG
2 TABLET ORAL
Qty: 0 | Refills: 0 | DISCHARGE
Start: 2020-04-17

## 2020-04-17 RX ORDER — ASCORBIC ACID 60 MG
1 TABLET,CHEWABLE ORAL
Qty: 0 | Refills: 0 | DISCHARGE
Start: 2020-04-17

## 2020-04-17 RX ADMIN — LOSARTAN POTASSIUM 50 MILLIGRAM(S): 100 TABLET, FILM COATED ORAL at 05:56

## 2020-04-17 RX ADMIN — Medication 1000 MILLIGRAM(S): at 15:07

## 2020-04-17 RX ADMIN — ENOXAPARIN SODIUM 40 MILLIGRAM(S): 100 INJECTION SUBCUTANEOUS at 10:07

## 2020-04-17 RX ADMIN — Medication 200 MILLIGRAM(S): at 15:07

## 2020-04-17 NOTE — CHART NOTE - NSCHARTNOTEFT_GEN_A_CORE
Source: Patient [ ]  Family [ ]   other [x] EMR    Current Diet:   Diet, DASH/TLC:   Sodium & Cholesterol Restricted  Low Fat (LOWFAT) (04-06-20 @ 20:58)    Current Weight:   (4/6)  210 lbs    % Weight Change: No recent weight documented     Pertinent Medications: MEDICATIONS  (STANDING):  ascorbic acid 1000 milliGRAM(s) Oral daily  enoxaparin Injectable 40 milliGRAM(s) SubCutaneous two times a day  losartan 50 milliGRAM(s) Oral daily  thiamine 200 milliGRAM(s) Oral daily    MEDICATIONS  (PRN):  acetaminophen   Tablet .. 650 milliGRAM(s) Oral every 4 hours PRN Temp greater or equal to 38.5C (101.3F)  acetaminophen  Suppository .. 650 milliGRAM(s) Rectal every 4 hours PRN Temp greater or equal to 38.5C (101.3F)  ALBUTerol    90 MICROgram(s) HFA Inhaler 2 Puff(s) Inhalation every 4 hours PRN Shortness of Breath and/or Wheezing  benzonatate 100 milliGRAM(s) Oral three times a day PRN Cough    Pertinent Labs: CBC Full  -  ( 17 Apr 2020 08:40 )  WBC Count : 6.79 K/uL  RBC Count : 5.37 M/uL  Hemoglobin : 15.8 g/dL  Hematocrit : 48.4 %  Platelet Count - Automated : 580 K/uL  Mean Cell Volume : 90.1 fl  Mean Cell Hemoglobin : 29.4 pg  Mean Cell Hemoglobin Concentration : 32.6 gm/dL    Skin: No skin breakdown/edema noted     Nutrition focused physical exam not conducted    Estimated Needs:   [x] no change since previous assessment  [ ] recalculated:     Current Nutrition Diagnosis: Pt remains at nutrition risk secondary to malnutrition (mild acute) related to insufficient protein energy intake in the setting of decreased appetite, SOB as evidenced by PO intake likely <75%, trace edema.     Recommendations:     Monitoring and Evaluation:   [ ] PO intake [ ] Tolerance to diet prescription [X] Weights  [X] Follow up per protocol [X] Labs: Source: Patient [ ]  Family [ ]   other [x] EMR    Current Diet:   Diet, DASH/TLC:   Sodium & Cholesterol Restricted  Low Fat (LOWFAT) (04-06-20 @ 20:58)    Current Weight:   (4/6)  210 lbs    % Weight Change: No recent weight documented     Pertinent Medications: MEDICATIONS  (STANDING):  ascorbic acid 1000 milliGRAM(s) Oral daily  enoxaparin Injectable 40 milliGRAM(s) SubCutaneous two times a day  losartan 50 milliGRAM(s) Oral daily  thiamine 200 milliGRAM(s) Oral daily    MEDICATIONS  (PRN):  acetaminophen   Tablet .. 650 milliGRAM(s) Oral every 4 hours PRN Temp greater or equal to 38.5C (101.3F)  acetaminophen  Suppository .. 650 milliGRAM(s) Rectal every 4 hours PRN Temp greater or equal to 38.5C (101.3F)  ALBUTerol    90 MICROgram(s) HFA Inhaler 2 Puff(s) Inhalation every 4 hours PRN Shortness of Breath and/or Wheezing  benzonatate 100 milliGRAM(s) Oral three times a day PRN Cough    Pertinent Labs: CBC Full  -  ( 17 Apr 2020 08:40 )  WBC Count : 6.79 K/uL  RBC Count : 5.37 M/uL  Hemoglobin : 15.8 g/dL  Hematocrit : 48.4 %  Platelet Count - Automated : 580 K/uL  Mean Cell Volume : 90.1 fl  Mean Cell Hemoglobin : 29.4 pg  Mean Cell Hemoglobin Concentration : 32.6 gm/dL    Skin: No skin breakdown/edema noted     Nutrition focused physical exam not conducted    Estimated Needs:   [x] no change since previous assessment  [ ] recalculated:     Current Nutrition Diagnosis: Pt remains at nutrition risk secondary to malnutrition (mild acute) related to insufficient protein energy intake in the setting of decreased appetite, SOB as evidenced by PO intake likely <75%, trace edema. Pt tested negative for COVID x3, continues to present with symptoms. Last documented BM 4/16.    Recommendations:   1) Add Ensure Enlive BID   2) Rx MVI daily, Continue thiamine and vit C    Monitoring and Evaluation:   [x] PO intake [x] Tolerance to diet prescription [X] Weights  [X] Follow up per protocol [X] Labs:

## 2020-04-17 NOTE — DISCHARGE NOTE PROVIDER - CARE PROVIDER_API CALL
PMD,   in 1 week, please call your office and get an appiontment  Phone: (   )    -  Fax: (   )    -  Follow Up Time:

## 2020-04-17 NOTE — DISCHARGE NOTE PROVIDER - PROVIDER TOKENS
FREE:[LAST:[PMD],PHONE:[(   )    -],FAX:[(   )    -],ADDRESS:[in 1 week, please call your office and get an appiontment]]

## 2020-04-17 NOTE — DISCHARGE NOTE NURSING/CASE MANAGEMENT/SOCIAL WORK - PATIENT PORTAL LINK FT
You can access the FollowMyHealth Patient Portal offered by [facility] by registering at the following website: [email or web address]. By joining [facility]’s FollowMyHealth portal, you will also be able to view your health information using other applications (apps) compatible with our system.

## 2020-04-17 NOTE — DISCHARGE NOTE NURSING/CASE MANAGEMENT/SOCIAL WORK - NSDCPEXARELTO_GEN_ALL_CORE
Rivaroxaban/Xarelto - Potential for adverse drug reactions and interactions/Rivaroxaban/Xarelto - Dietary Advice/Rivaroxaban/Xarelto - Compliance/Rivaroxaban/Xarelto - Follow up monitoring Yes

## 2020-04-17 NOTE — DISCHARGE NOTE PROVIDER - HOSPITAL COURSE
61 yo Male with PMH significant for HTN, HLD presents today c/o cough, subjective fever, worsening SOB for the 3 days, being admitted for Acute Respiratory Failure with Hypoxia likely secondary to viral Pneumonia, COVID-19 result, In the ED, saturation was on RA 88%; placed on O2 via NC 5-6; inflammatory makers elevated. CXR: multifocal pneumonia    patient was admitted under medicine for acute hypoxic resp failure due to Pneumonia could be due to gram positive/gram negative bacterial infection, he was given antibiotics, his COVID came negative x3, rvp negative, ID consult appreciated complete HCQ as he looked like highly suspicious of COVID 19 infection, he completed the course of Ceftriaxone and azithromycin as well, he was seen and followed by Pulmonary team, he was initially on supplemental oxygen with non rebreather, wean done to venti mask then nasal cannula, now is saturating 93%% on RA at rest and ambulation.     Patient  was noted to have Hyponatremia Likely due to dehydration 2/2 poor PO intake, slowly improved currently 132     Patient has Hx of HTN, continued Losartan 50 mg QD, resume his home medication.         He has Hx of HLD, his atorvastatin was held because of elevated LFT, he was told to see his doctor and get his LFTs checked before he can resume his Atorvastatin.         patient is doing ok, he is saturating well on RA, his all symptoms resolved, he is being discharged home in a stable condition.        Vital Signs Last 24 Hrs    T(C): 36.8 (17 Apr 2020 07:55), Max: 37 (16 Apr 2020 14:15)    T(F): 98.3 (17 Apr 2020 07:55), Max: 98.6 (16 Apr 2020 14:15)    HR: 77 (17 Apr 2020 07:55) (73 - 78)    BP: 118/72 (17 Apr 2020 07:55) (110/72 - 124/72)    RR: 20 (17 Apr 2020 10:05) (18 - 20)    SpO2: 93% (17 Apr 2020 13:08) (93% - 97%)        PHYSICAL EXAM:        GENERAL: Middle age  male looking comfortable     HEENT: PERRL, +EOMI    NECK: soft, Supple, No JVD     CHEST/LUNG: Decrease air entry bilaterally; No wheezing    HEART: S1S2+, Regular rate and rhythm; No murmurs    ABDOMEN: Soft, Nontender, Nondistended; Bowel sounds present    EXTREMITIES:  1+ Peripheral Pulses, No edema    SKIN: No rashes or lesions    NEURO: AAOX3, no focal deficits, no motor r sensory loss    PSYCH: normal mood        Total time spent 36 minute

## 2020-04-17 NOTE — DISCHARGE NOTE PROVIDER - NSDCMRMEDTOKEN_GEN_ALL_CORE_FT
acetaminophen 325 mg oral tablet: 2 tab(s) orally every 4 hours, As needed, Temp greater or equal to 38.5C (101.3F)  albuterol 90 mcg/inh inhalation aerosol: 2 puff(s) inhaled every 4 hours, As needed, Shortness of Breath and/or Wheezing  ascorbic acid 1000 mg oral tablet: 1 tab(s) orally once a day  atorvastatin 10 mg oral tablet: 1 tab(s) orally once a day  benzonatate 100 mg oral capsule: 1 cap(s) orally 3 times a day, As needed, Cough  irbesartan 150 mg oral tablet: 1 tab(s) orally once a day  thiamine 100 mg oral tablet: 2 tab(s) orally once a day  Xarelto 10 mg oral tablet: 1 tab(s) orally once a day acetaminophen 325 mg oral tablet: 2 tab(s) orally every 4 hours, As needed, Temp greater or equal to 38.5C (101.3F)  albuterol 90 mcg/inh inhalation aerosol: 2 puff(s) inhaled every 4 hours, As needed, Shortness of Breath and/or Wheezing  ascorbic acid 1000 mg oral tablet: 1 tab(s) orally once a day  benzonatate 100 mg oral capsule: 1 cap(s) orally 3 times a day, As needed, Cough  irbesartan 150 mg oral tablet: 1 tab(s) orally once a day  thiamine 100 mg oral tablet: 2 tab(s) orally once a day  Xarelto 10 mg oral tablet: 1 tab(s) orally once a day

## 2020-04-21 LAB
A-TUMOR NECROSIS FACT SERPL-MCNC: 5 PG/ML — SIGNIFICANT CHANGE UP
IL10 SERPL-MCNC: 6 PG/ML — SIGNIFICANT CHANGE UP
IL12 SERPL-MCNC: <5 PG/ML — SIGNIFICANT CHANGE UP
IL13 SERPL-MCNC: <5 PG/ML — SIGNIFICANT CHANGE UP
IL2 SERPL-MCNC: 1628 PG/ML — HIGH
IL2 SERPL-MCNC: <5 PG/ML — SIGNIFICANT CHANGE UP
IL4 SERPL-MCNC: <5 PG/ML — SIGNIFICANT CHANGE UP
IL6 SERPL-MCNC: 5 PG/ML — SIGNIFICANT CHANGE UP
IL8 SERPL-MCNC: 14 PG/ML — HIGH
INTERFERON GAMMA: 6 PG/ML — HIGH
INTERLEUKIN 1 BETA: <5 PG/ML — SIGNIFICANT CHANGE UP
INTERLEUKIN 17: 6 PG/ML — SIGNIFICANT CHANGE UP
INTERLEUKIN 5: <5 PG/ML — SIGNIFICANT CHANGE UP

## 2024-04-19 PROBLEM — E78.5 HYPERLIPIDEMIA, UNSPECIFIED: Chronic | Status: ACTIVE | Noted: 2020-04-06

## 2024-04-19 PROBLEM — I10 ESSENTIAL (PRIMARY) HYPERTENSION: Chronic | Status: ACTIVE | Noted: 2020-04-06

## 2024-07-10 ENCOUNTER — OFFICE (OUTPATIENT)
Dept: URBAN - METROPOLITAN AREA CLINIC 94 | Facility: CLINIC | Age: 64
Setting detail: OPHTHALMOLOGY
End: 2024-07-10
Payer: COMMERCIAL

## 2024-07-10 DIAGNOSIS — H40.053: ICD-10-CM

## 2024-07-10 DIAGNOSIS — H33.8: ICD-10-CM

## 2024-07-10 DIAGNOSIS — H35.033: ICD-10-CM

## 2024-07-10 DIAGNOSIS — H25.13: ICD-10-CM

## 2024-07-10 PROCEDURE — 92004 COMPRE OPH EXAM NEW PT 1/>: CPT | Performed by: OPHTHALMOLOGY

## 2024-07-10 PROCEDURE — 76512 OPH US DX B-SCAN: CPT | Performed by: OPHTHALMOLOGY

## 2024-07-10 PROCEDURE — 92134 CPTRZ OPH DX IMG PST SGM RTA: CPT | Performed by: OPHTHALMOLOGY

## 2024-07-10 ASSESSMENT — CONFRONTATIONAL VISUAL FIELD TEST (CVF)
OD_FINDINGS: FULL
OS_FINDINGS: FULL

## 2024-07-11 ENCOUNTER — OFFICE (OUTPATIENT)
Dept: URBAN - METROPOLITAN AREA CLINIC 94 | Facility: CLINIC | Age: 64
Setting detail: OPHTHALMOLOGY
End: 2024-07-11
Payer: COMMERCIAL

## 2024-07-11 DIAGNOSIS — H43.12: ICD-10-CM

## 2024-07-11 PROCEDURE — 92134 CPTRZ OPH DX IMG PST SGM RTA: CPT | Performed by: SPECIALIST

## 2024-07-11 PROCEDURE — 76512 OPH US DX B-SCAN: CPT | Mod: LT | Performed by: SPECIALIST

## 2024-07-11 PROCEDURE — 92014 COMPRE OPH EXAM EST PT 1/>: CPT | Performed by: SPECIALIST

## 2024-07-11 ASSESSMENT — CONFRONTATIONAL VISUAL FIELD TEST (CVF)
OS_FINDINGS: FULL
OD_FINDINGS: FULL

## 2024-07-15 ENCOUNTER — OFFICE (OUTPATIENT)
Dept: URBAN - METROPOLITAN AREA CLINIC 94 | Facility: CLINIC | Age: 64
Setting detail: OPHTHALMOLOGY
End: 2024-07-15
Payer: COMMERCIAL

## 2024-07-15 ENCOUNTER — ASC (OUTPATIENT)
Dept: URBAN - METROPOLITAN AREA SURGERY 8 | Facility: SURGERY | Age: 64
Setting detail: OPHTHALMOLOGY
End: 2024-07-15
Payer: COMMERCIAL

## 2024-07-15 DIAGNOSIS — H33.312: ICD-10-CM

## 2024-07-15 DIAGNOSIS — H43.12: ICD-10-CM

## 2024-07-15 PROCEDURE — 67145 PROPH RTA DTCHMNT PC: CPT | Mod: LT | Performed by: SPECIALIST

## 2024-07-15 PROCEDURE — 92012 INTRM OPH EXAM EST PATIENT: CPT | Mod: 25 | Performed by: SPECIALIST

## 2024-07-15 PROCEDURE — 92134 CPTRZ OPH DX IMG PST SGM RTA: CPT | Performed by: SPECIALIST

## 2024-07-15 ASSESSMENT — CONFRONTATIONAL VISUAL FIELD TEST (CVF)
OS_FINDINGS: CONSTRICTION
OD_FINDINGS: FULL

## 2024-07-17 ENCOUNTER — OFFICE (OUTPATIENT)
Dept: URBAN - METROPOLITAN AREA CLINIC 110 | Facility: CLINIC | Age: 64
Setting detail: OPHTHALMOLOGY
End: 2024-07-17
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
## 2024-07-17 PROCEDURE — NO SHOW FE NO SHOW FEE: Performed by: SPECIALIST

## 2024-07-18 ENCOUNTER — OFFICE (OUTPATIENT)
Dept: URBAN - METROPOLITAN AREA CLINIC 94 | Facility: CLINIC | Age: 64
Setting detail: OPHTHALMOLOGY
End: 2024-07-18
Payer: COMMERCIAL

## 2024-07-18 DIAGNOSIS — H33.312: ICD-10-CM

## 2024-07-18 DIAGNOSIS — H43.12: ICD-10-CM

## 2024-07-18 PROCEDURE — 76512 OPH US DX B-SCAN: CPT | Performed by: SPECIALIST

## 2024-07-18 PROCEDURE — 92134 CPTRZ OPH DX IMG PST SGM RTA: CPT | Performed by: SPECIALIST

## 2024-07-18 PROCEDURE — 67028 INJECTION EYE DRUG: CPT | Mod: 58,LT | Performed by: SPECIALIST

## 2024-07-18 ASSESSMENT — CONFRONTATIONAL VISUAL FIELD TEST (CVF)
OD_FINDINGS: FULL
OS_FINDINGS: CONSTRICTION

## 2024-07-22 ENCOUNTER — OFFICE (OUTPATIENT)
Dept: URBAN - METROPOLITAN AREA CLINIC 94 | Facility: CLINIC | Age: 64
Setting detail: OPHTHALMOLOGY
End: 2024-07-22
Payer: COMMERCIAL

## 2024-07-22 DIAGNOSIS — H43.12: ICD-10-CM

## 2024-07-22 DIAGNOSIS — H33.312: ICD-10-CM

## 2024-07-22 PROCEDURE — 99024 POSTOP FOLLOW-UP VISIT: CPT | Performed by: SPECIALIST

## 2024-07-22 PROCEDURE — 92134 CPTRZ OPH DX IMG PST SGM RTA: CPT | Performed by: SPECIALIST

## 2024-07-22 ASSESSMENT — CONFRONTATIONAL VISUAL FIELD TEST (CVF)
OD_FINDINGS: FULL
OS_FINDINGS: FULL

## 2024-07-29 ENCOUNTER — OFFICE (OUTPATIENT)
Dept: URBAN - METROPOLITAN AREA CLINIC 94 | Facility: CLINIC | Age: 64
Setting detail: OPHTHALMOLOGY
End: 2024-07-29
Payer: COMMERCIAL

## 2024-07-29 DIAGNOSIS — H33.312: ICD-10-CM

## 2024-07-29 DIAGNOSIS — H43.12: ICD-10-CM

## 2024-07-29 PROBLEM — H25.13 CATARACT SENILE NUCLEAR SCLEROSIS; BOTH EYES: Status: ACTIVE | Noted: 2024-07-10

## 2024-07-29 PROCEDURE — 92134 CPTRZ OPH DX IMG PST SGM RTA: CPT | Performed by: SPECIALIST

## 2024-07-29 PROCEDURE — 92012 INTRM OPH EXAM EST PATIENT: CPT | Performed by: SPECIALIST

## 2024-07-29 ASSESSMENT — CONFRONTATIONAL VISUAL FIELD TEST (CVF)
OD_FINDINGS: FULL
OS_FINDINGS: FULL

## 2024-07-30 ENCOUNTER — NON-APPOINTMENT (OUTPATIENT)
Age: 64
End: 2024-07-30

## 2024-07-30 ENCOUNTER — APPOINTMENT (OUTPATIENT)
Dept: INTERNAL MEDICINE | Facility: CLINIC | Age: 64
End: 2024-07-30
Payer: COMMERCIAL

## 2024-07-30 VITALS
HEIGHT: 65 IN | DIASTOLIC BLOOD PRESSURE: 82 MMHG | RESPIRATION RATE: 12 BRPM | BODY MASS INDEX: 29.66 KG/M2 | SYSTOLIC BLOOD PRESSURE: 132 MMHG | WEIGHT: 178 LBS | HEART RATE: 67 BPM

## 2024-07-30 DIAGNOSIS — N50.812 LEFT TESTICULAR PAIN: ICD-10-CM

## 2024-07-30 DIAGNOSIS — Z23 ENCOUNTER FOR IMMUNIZATION: ICD-10-CM

## 2024-07-30 DIAGNOSIS — Z00.00 ENCOUNTER FOR GENERAL ADULT MEDICAL EXAMINATION W/OUT ABNORMAL FINDINGS: ICD-10-CM

## 2024-07-30 PROCEDURE — 90677 PCV20 VACCINE IM: CPT

## 2024-07-30 PROCEDURE — 90715 TDAP VACCINE 7 YRS/> IM: CPT

## 2024-07-30 PROCEDURE — 36415 COLL VENOUS BLD VENIPUNCTURE: CPT

## 2024-07-30 PROCEDURE — G0444 DEPRESSION SCREEN ANNUAL: CPT | Mod: 59

## 2024-07-30 PROCEDURE — 99386 PREV VISIT NEW AGE 40-64: CPT | Mod: 25

## 2024-07-30 PROCEDURE — 90472 IMMUNIZATION ADMIN EACH ADD: CPT

## 2024-07-30 PROCEDURE — G0009: CPT

## 2024-07-30 PROCEDURE — 93000 ELECTROCARDIOGRAM COMPLETE: CPT | Mod: 59

## 2024-07-30 NOTE — HEALTH RISK ASSESSMENT
[Excellent] : ~his/her~  mood as  excellent [No] : No [No falls in past year] : Patient reported no falls in the past year [Little interest or pleasure doing things] : 1) Little interest or pleasure doing things [Feeling down, depressed, or hopeless] : 2) Feeling down, depressed, or hopeless [0] : 2) Feeling down, depressed, or hopeless: Not at all (0) [PHQ-2 Negative - No further assessment needed] : PHQ-2 Negative - No further assessment needed [[id number]] : 0 [Never] : Never [NO] : No [HIV test declined] : HIV test declined [Hepatitis C test declined] : Hepatitis C test declined [Change in mental status noted] : No change in mental status noted [Language] : denies difficulty with language [Behavior] : denies difficulty with behavior [Learning/Retaining New Information] : denies difficulty learning/retaining new information [Handling Complex Tasks] : denies difficulty handling complex tasks [Reasoning] : denies difficulty with reasoning [Spatial Ability and Orientation] : denies difficulty with spatial ability and orientation [None] : None [With Significant Other] : lives with significant other [Employed] : employed [High School] : high school [Sexually Active] : sexually active [High Risk Behavior] : no high risk behavior [Feels Safe at Home] : Feels safe at home [Fully functional (bathing, dressing, toileting, transferring, walking, feeding)] : Fully functional (bathing, dressing, toileting, transferring, walking, feeding) [Fully functional (using the telephone, shopping, preparing meals, housekeeping, doing laundry, using] : Fully functional and needs no help or supervision to perform IADLs (using the telephone, shopping, preparing meals, housekeeping, doing laundry, using transportation, managing medications and managing finances) [Reports changes in hearing] : Reports no changes in hearing [Reports changes in vision] : Reports no changes in vision [Reports normal functional visual acuity (ie: able to read med bottle)] : Reports poor functional visual acuity.  [Reports changes in dental health] : Reports no changes in dental health [Smoke Detector] : smoke detector [Safety elements used in home] : safety elements used in home [Seat Belt] :  uses seat belt [Sunscreen] : does not use sunscreen [Travel to Developing Areas] : does not  travel to developing areas [TB Exposure] : is not being exposed to tuberculosis [Caregiver Concerns] : does not have caregiver concerns [ColonoscopyDate] : 2012 [ColonoscopyComments] : due

## 2024-07-30 NOTE — HISTORY OF PRESENT ILLNESS
[FreeTextEntry1] : for cpe [de-identified] : for cpe had bilateral PNA in 2020 never tested positive for covid has been well since not taking any meds right now had htn and hld in the past works in[employer]

## 2024-07-31 LAB
25(OH)D3 SERPL-MCNC: 20.8 NG/ML
ALBUMIN SERPL ELPH-MCNC: 4.8 G/DL
ALP BLD-CCNC: 112 U/L
ALT SERPL-CCNC: 20 U/L
ANION GAP SERPL CALC-SCNC: 13 MMOL/L
APPEARANCE: CLEAR
AST SERPL-CCNC: 21 U/L
BACTERIA: NEGATIVE /HPF
BASOPHILS # BLD AUTO: 0.02 K/UL
BASOPHILS NFR BLD AUTO: 0.3 %
BILIRUB SERPL-MCNC: 0.9 MG/DL
BILIRUBIN URINE: NEGATIVE
BLOOD URINE: NEGATIVE
BUN SERPL-MCNC: 18 MG/DL
CALCIUM SERPL-MCNC: 9.8 MG/DL
CAST: 3 /LPF
CHLORIDE SERPL-SCNC: 103 MMOL/L
CHOLEST SERPL-MCNC: 219 MG/DL
CO2 SERPL-SCNC: 25 MMOL/L
COLOR: YELLOW
CREAT SERPL-MCNC: 1.03 MG/DL
EGFR: 82 ML/MIN/1.73M2
EOSINOPHIL # BLD AUTO: 0.18 K/UL
EOSINOPHIL NFR BLD AUTO: 2.7 %
EPITHELIAL CELLS: 0 /HPF
ESTIMATED AVERAGE GLUCOSE: 123 MG/DL
FOLATE SERPL-MCNC: 12.7 NG/ML
GLUCOSE QUALITATIVE U: NEGATIVE MG/DL
GLUCOSE SERPL-MCNC: 100 MG/DL
HBA1C MFR BLD HPLC: 5.9 %
HCT VFR BLD CALC: 50.7 %
HDLC SERPL-MCNC: 34 MG/DL
HGB BLD-MCNC: 16.2 G/DL
IMM GRANULOCYTES NFR BLD AUTO: 0.2 %
KETONES URINE: NEGATIVE MG/DL
LDLC SERPL CALC-MCNC: 145 MG/DL
LEUKOCYTE ESTERASE URINE: NEGATIVE
LYMPHOCYTES # BLD AUTO: 3.01 K/UL
LYMPHOCYTES NFR BLD AUTO: 45.5 %
MAN DIFF?: NORMAL
MCHC RBC-ENTMCNC: 30.5 PG
MCHC RBC-ENTMCNC: 32 GM/DL
MCV RBC AUTO: 95.5 FL
MICROSCOPIC-UA: NORMAL
MONOCYTES # BLD AUTO: 0.49 K/UL
MONOCYTES NFR BLD AUTO: 7.4 %
NEUTROPHILS # BLD AUTO: 2.91 K/UL
NEUTROPHILS NFR BLD AUTO: 43.9 %
NITRITE URINE: NEGATIVE
NONHDLC SERPL-MCNC: 185 MG/DL
PH URINE: 6
PLATELET # BLD AUTO: 269 K/UL
POTASSIUM SERPL-SCNC: 5.1 MMOL/L
PROT SERPL-MCNC: 7.3 G/DL
PROTEIN URINE: NORMAL MG/DL
PSA SERPL-MCNC: 2.29 NG/ML
RBC # BLD: 5.31 M/UL
RBC # FLD: 13.2 %
RED BLOOD CELLS URINE: 2 /HPF
SODIUM SERPL-SCNC: 140 MMOL/L
SPECIFIC GRAVITY URINE: 1.03
T4 FREE SERPL-MCNC: 1.2 NG/DL
TRIGL SERPL-MCNC: 220 MG/DL
TSH SERPL-ACNC: 1.98 UIU/ML
UROBILINOGEN URINE: 0.2 MG/DL
VIT B12 SERPL-MCNC: 684 PG/ML
WBC # FLD AUTO: 6.62 K/UL
WHITE BLOOD CELLS URINE: 0 /HPF

## 2024-08-02 LAB
CREAT SPEC-SCNC: 205 MG/DL
MICROALBUMIN 24H UR DL<=1MG/L-MCNC: <1.2 MG/DL
MICROALBUMIN/CREAT 24H UR-RTO: NORMAL MG/G

## 2024-08-05 ENCOUNTER — OFFICE (OUTPATIENT)
Dept: URBAN - METROPOLITAN AREA CLINIC 94 | Facility: CLINIC | Age: 64
Setting detail: OPHTHALMOLOGY
End: 2024-08-05
Payer: COMMERCIAL

## 2024-08-05 DIAGNOSIS — H33.312: ICD-10-CM

## 2024-08-05 DIAGNOSIS — H43.12: ICD-10-CM

## 2024-08-05 PROCEDURE — 92250 FUNDUS PHOTOGRAPHY W/I&R: CPT | Performed by: SPECIALIST

## 2024-08-05 PROCEDURE — 92012 INTRM OPH EXAM EST PATIENT: CPT | Performed by: SPECIALIST

## 2024-08-05 ASSESSMENT — CONFRONTATIONAL VISUAL FIELD TEST (CVF)
OS_FINDINGS: FULL
OD_FINDINGS: FULL

## 2024-08-12 ENCOUNTER — OFFICE (OUTPATIENT)
Dept: URBAN - METROPOLITAN AREA CLINIC 94 | Facility: CLINIC | Age: 64
Setting detail: OPHTHALMOLOGY
End: 2024-08-12
Payer: COMMERCIAL

## 2024-08-12 DIAGNOSIS — H35.033: ICD-10-CM

## 2024-08-12 DIAGNOSIS — H33.312: ICD-10-CM

## 2024-08-12 DIAGNOSIS — H43.12: ICD-10-CM

## 2024-08-12 PROCEDURE — 92012 INTRM OPH EXAM EST PATIENT: CPT | Performed by: SPECIALIST

## 2024-08-12 PROCEDURE — 92134 CPTRZ OPH DX IMG PST SGM RTA: CPT | Performed by: SPECIALIST

## 2024-08-12 ASSESSMENT — CONFRONTATIONAL VISUAL FIELD TEST (CVF)
OS_FINDINGS: FULL
OD_FINDINGS: FULL

## 2024-08-14 ENCOUNTER — APPOINTMENT (OUTPATIENT)
Dept: ULTRASOUND IMAGING | Facility: CLINIC | Age: 64
End: 2024-08-14

## 2024-08-19 ENCOUNTER — OFFICE (OUTPATIENT)
Dept: URBAN - METROPOLITAN AREA CLINIC 94 | Facility: CLINIC | Age: 64
Setting detail: OPHTHALMOLOGY
End: 2024-08-19
Payer: COMMERCIAL

## 2024-08-19 DIAGNOSIS — H43.12: ICD-10-CM

## 2024-08-19 DIAGNOSIS — H35.033: ICD-10-CM

## 2024-08-19 DIAGNOSIS — H33.312: ICD-10-CM

## 2024-08-19 PROCEDURE — 92012 INTRM OPH EXAM EST PATIENT: CPT | Performed by: SPECIALIST

## 2024-08-19 PROCEDURE — 92134 CPTRZ OPH DX IMG PST SGM RTA: CPT | Performed by: SPECIALIST

## 2024-08-19 ASSESSMENT — CONFRONTATIONAL VISUAL FIELD TEST (CVF)
OS_FINDINGS: FULL
OD_FINDINGS: FULL

## 2024-10-07 ENCOUNTER — OFFICE (OUTPATIENT)
Dept: URBAN - METROPOLITAN AREA CLINIC 94 | Facility: CLINIC | Age: 64
Setting detail: OPHTHALMOLOGY
End: 2024-10-07
Payer: COMMERCIAL

## 2024-10-07 DIAGNOSIS — H35.033: ICD-10-CM

## 2024-10-07 DIAGNOSIS — H43.12: ICD-10-CM

## 2024-10-07 DIAGNOSIS — H33.312: ICD-10-CM

## 2024-10-07 PROCEDURE — 92012 INTRM OPH EXAM EST PATIENT: CPT | Performed by: SPECIALIST

## 2024-10-07 PROCEDURE — 92134 CPTRZ OPH DX IMG PST SGM RTA: CPT | Performed by: SPECIALIST

## 2024-10-07 ASSESSMENT — CONFRONTATIONAL VISUAL FIELD TEST (CVF)
OS_FINDINGS: FULL
OD_FINDINGS: FULL

## 2024-10-07 ASSESSMENT — KERATOMETRY
OD_K1POWER_DIOPTERS: 42.75
OD_K2POWER_DIOPTERS: 42.75
OD_AXISANGLE_DEGREES: 090

## 2024-10-07 ASSESSMENT — VISUAL ACUITY
OD_BCVA: 20/20
OS_BCVA: 20/20

## 2024-10-07 ASSESSMENT — REFRACTION_AUTOREFRACTION
OD_AXIS: 082
OD_SPHERE: +1.75
OD_CYLINDER: -0.25

## 2024-10-07 ASSESSMENT — TONOMETRY: OD_IOP_MMHG: 21

## 2025-02-10 ENCOUNTER — OFFICE (OUTPATIENT)
Dept: URBAN - METROPOLITAN AREA CLINIC 94 | Facility: CLINIC | Age: 65
Setting detail: OPHTHALMOLOGY
End: 2025-02-10
Payer: COMMERCIAL

## 2025-02-10 ENCOUNTER — RX ONLY (RX ONLY)
Age: 65
End: 2025-02-10

## 2025-02-10 DIAGNOSIS — H35.033: ICD-10-CM

## 2025-02-10 DIAGNOSIS — H33.312: ICD-10-CM

## 2025-02-10 DIAGNOSIS — H43.12: ICD-10-CM

## 2025-02-10 PROCEDURE — 92014 COMPRE OPH EXAM EST PT 1/>: CPT | Performed by: SPECIALIST

## 2025-02-10 PROCEDURE — 92134 CPTRZ OPH DX IMG PST SGM RTA: CPT | Performed by: SPECIALIST

## 2025-02-10 ASSESSMENT — CONFRONTATIONAL VISUAL FIELD TEST (CVF)
OD_FINDINGS: FULL
OS_FINDINGS: FULL

## 2025-02-10 ASSESSMENT — KERATOMETRY
OD_K2POWER_DIOPTERS: 42.75
OD_AXISANGLE_DEGREES: 090
OD_K1POWER_DIOPTERS: 42.75

## 2025-02-10 ASSESSMENT — REFRACTION_AUTOREFRACTION
OD_AXIS: 082
OD_SPHERE: +1.75
OD_CYLINDER: -0.25

## 2025-02-10 ASSESSMENT — VISUAL ACUITY
OD_BCVA: 20/20
OS_BCVA: 20/20-1

## 2025-02-17 ENCOUNTER — OFFICE (OUTPATIENT)
Dept: URBAN - METROPOLITAN AREA CLINIC 94 | Facility: CLINIC | Age: 65
Setting detail: OPHTHALMOLOGY
End: 2025-02-17
Payer: COMMERCIAL

## 2025-02-17 DIAGNOSIS — H25.13: ICD-10-CM

## 2025-02-17 DIAGNOSIS — H40.053: ICD-10-CM

## 2025-02-17 DIAGNOSIS — H43.12: ICD-10-CM

## 2025-02-17 DIAGNOSIS — H35.033: ICD-10-CM

## 2025-02-17 PROCEDURE — 92014 COMPRE OPH EXAM EST PT 1/>: CPT | Performed by: OPHTHALMOLOGY

## 2025-02-17 PROCEDURE — 76514 ECHO EXAM OF EYE THICKNESS: CPT | Performed by: OPHTHALMOLOGY

## 2025-02-17 PROCEDURE — 92133 CPTRZD OPH DX IMG PST SGM ON: CPT | Performed by: OPHTHALMOLOGY

## 2025-02-17 ASSESSMENT — REFRACTION_CURRENTRX
OS_VPRISM_DIRECTION: PROGS
OS_ADD: +2.50
OS_OVR_VA: 20/
OD_ADD: +2.50
OD_VPRISM_DIRECTION: PROGS
OD_SPHERE: +1.25
OD_OVR_VA: 20/
OS_SPHERE: +1.00

## 2025-02-17 ASSESSMENT — REFRACTION_MANIFEST
OD_CYLINDER: -0.50
OD_ADD: +2.25
OD_AXIS: 90
OS_SPHERE: +1.50
OS_AXIS: 90
OS_VA1: 20/20
OS_ADD: +2.25
OD_SPHERE: +1.50
OS_CYLINDER: -0.50
OD_VA1: 20/20

## 2025-02-17 ASSESSMENT — PACHYMETRY
OS_CT_UM: 564
OD_CT_UM: 591
OD_CT_CORRECTION: -4
OS_CT_CORRECTION: -1

## 2025-02-17 ASSESSMENT — KERATOMETRY
OD_K1POWER_DIOPTERS: 42.50
OS_K2POWER_DIOPTERS: 42.25
OS_AXISANGLE_DEGREES: 090
OD_K2POWER_DIOPTERS: 42.75
OD_AXISANGLE_DEGREES: 019
OS_K1POWER_DIOPTERS: 42.25

## 2025-02-17 ASSESSMENT — CONFRONTATIONAL VISUAL FIELD TEST (CVF)
OS_FINDINGS: FULL
OD_FINDINGS: FULL

## 2025-02-17 ASSESSMENT — REFRACTION_AUTOREFRACTION
OD_CYLINDER: -0.50
OD_AXIS: 088
OD_SPHERE: +2.00
OS_SPHERE: +2.00
OS_AXIS: 088
OS_CYLINDER: -0.50

## 2025-02-17 ASSESSMENT — VISUAL ACUITY
OD_BCVA: 20/20-1
OS_BCVA: 20/20

## 2025-08-11 ENCOUNTER — OFFICE (OUTPATIENT)
Dept: URBAN - METROPOLITAN AREA CLINIC 94 | Facility: CLINIC | Age: 65
Setting detail: OPHTHALMOLOGY
End: 2025-08-11
Payer: COMMERCIAL

## 2025-08-11 DIAGNOSIS — H43.12: ICD-10-CM

## 2025-08-11 DIAGNOSIS — H33.312: ICD-10-CM

## 2025-08-11 DIAGNOSIS — H16.222: ICD-10-CM

## 2025-08-11 DIAGNOSIS — H35.3131: ICD-10-CM

## 2025-08-11 PROCEDURE — 92014 COMPRE OPH EXAM EST PT 1/>: CPT | Performed by: SPECIALIST

## 2025-08-11 PROCEDURE — 92134 CPTRZ OPH DX IMG PST SGM RTA: CPT | Performed by: SPECIALIST

## 2025-08-11 ASSESSMENT — KERATOMETRY
OD_K1POWER_DIOPTERS: 42.50
OD_K2POWER_DIOPTERS: 42.75
OD_AXISANGLE_DEGREES: 019
OS_K2POWER_DIOPTERS: 42.25
OS_K1POWER_DIOPTERS: 42.25
OS_AXISANGLE_DEGREES: 090

## 2025-08-11 ASSESSMENT — CONFRONTATIONAL VISUAL FIELD TEST (CVF)
OD_FINDINGS: FULL
OS_FINDINGS: FULL

## 2025-08-11 ASSESSMENT — PACHYMETRY
OS_CT_CORRECTION: -1
OD_CT_CORRECTION: -4
OD_CT_UM: 591
OS_CT_UM: 564

## 2025-08-11 ASSESSMENT — REFRACTION_AUTOREFRACTION
OD_AXIS: 088
OS_CYLINDER: -0.50
OD_CYLINDER: -0.50
OS_SPHERE: +2.00
OS_AXIS: 088
OD_SPHERE: +2.00

## 2025-08-11 ASSESSMENT — VISUAL ACUITY
OS_BCVA: 20/20
OD_BCVA: 20/20-2